# Patient Record
Sex: MALE | URBAN - METROPOLITAN AREA
[De-identification: names, ages, dates, MRNs, and addresses within clinical notes are randomized per-mention and may not be internally consistent; named-entity substitution may affect disease eponyms.]

---

## 2022-01-24 ENCOUNTER — OUTPATIENT (OUTPATIENT)
Dept: OUTPATIENT SERVICES | Facility: HOSPITAL | Age: 67
LOS: 1 days | End: 2022-01-24
Payer: MEDICARE

## 2022-01-24 VITALS
TEMPERATURE: 98 F | DIASTOLIC BLOOD PRESSURE: 95 MMHG | OXYGEN SATURATION: 98 % | HEART RATE: 65 BPM | RESPIRATION RATE: 14 BRPM | WEIGHT: 250 LBS | HEIGHT: 68 IN | SYSTOLIC BLOOD PRESSURE: 181 MMHG

## 2022-01-24 VITALS
HEART RATE: 83 BPM | OXYGEN SATURATION: 98 % | SYSTOLIC BLOOD PRESSURE: 120 MMHG | DIASTOLIC BLOOD PRESSURE: 77 MMHG | RESPIRATION RATE: 17 BRPM

## 2022-01-24 DIAGNOSIS — Z87.81 PERSONAL HISTORY OF (HEALED) TRAUMATIC FRACTURE: Chronic | ICD-10-CM

## 2022-01-24 DIAGNOSIS — Z90.79 ACQUIRED ABSENCE OF OTHER GENITAL ORGAN(S): Chronic | ICD-10-CM

## 2022-01-24 DIAGNOSIS — R94.09 ABNORMAL RESULTS OF OTHER FUNCTION STUDIES OF CENTRAL NERVOUS SYSTEM: ICD-10-CM

## 2022-01-24 LAB
BUN SERPL-MCNC: 23 MG/DL — SIGNIFICANT CHANGE UP (ref 7–23)
CALCIUM SERPL-MCNC: 9.8 MG/DL — SIGNIFICANT CHANGE UP (ref 8.4–10.5)
CHLORIDE SERPL-SCNC: 101 MMOL/L — SIGNIFICANT CHANGE UP (ref 96–108)
CO2 SERPL-SCNC: 20 MMOL/L — LOW (ref 22–31)
CREAT SERPL-MCNC: 1.07 MG/DL — SIGNIFICANT CHANGE UP (ref 0.5–1.3)
GLUCOSE BLDC GLUCOMTR-MCNC: 142 MG/DL — HIGH (ref 70–99)
GLUCOSE BLDC GLUCOMTR-MCNC: 146 MG/DL — HIGH (ref 70–99)
GLUCOSE BLDC GLUCOMTR-MCNC: 161 MG/DL — HIGH (ref 70–99)
GLUCOSE SERPL-MCNC: 157 MG/DL — HIGH (ref 70–99)
HCT VFR BLD CALC: 40.6 % — SIGNIFICANT CHANGE UP (ref 39–50)
HGB BLD-MCNC: 13.3 G/DL — SIGNIFICANT CHANGE UP (ref 13–17)
MCHC RBC-ENTMCNC: 29.2 PG — SIGNIFICANT CHANGE UP (ref 27–34)
MCHC RBC-ENTMCNC: 32.8 GM/DL — SIGNIFICANT CHANGE UP (ref 32–36)
MCV RBC AUTO: 89.2 FL — SIGNIFICANT CHANGE UP (ref 80–100)
NRBC # BLD: 0 /100 WBCS — SIGNIFICANT CHANGE UP (ref 0–0)
PLATELET # BLD AUTO: 234 K/UL — SIGNIFICANT CHANGE UP (ref 150–400)
POTASSIUM SERPL-MCNC: 4.3 MMOL/L — SIGNIFICANT CHANGE UP (ref 3.5–5.3)
POTASSIUM SERPL-SCNC: 4.3 MMOL/L — SIGNIFICANT CHANGE UP (ref 3.5–5.3)
RBC # BLD: 4.55 M/UL — SIGNIFICANT CHANGE UP (ref 4.2–5.8)
RBC # FLD: 12.7 % — SIGNIFICANT CHANGE UP (ref 10.3–14.5)
SODIUM SERPL-SCNC: 136 MMOL/L — SIGNIFICANT CHANGE UP (ref 135–145)
WBC # BLD: 5.84 K/UL — SIGNIFICANT CHANGE UP (ref 3.8–10.5)
WBC # FLD AUTO: 5.84 K/UL — SIGNIFICANT CHANGE UP (ref 3.8–10.5)

## 2022-01-24 PROCEDURE — 93454 CORONARY ARTERY ANGIO S&I: CPT | Mod: 26,59

## 2022-01-24 PROCEDURE — C1874: CPT

## 2022-01-24 PROCEDURE — 92928 PRQ TCAT PLMT NTRAC ST 1 LES: CPT | Mod: RC

## 2022-01-24 PROCEDURE — 93010 ELECTROCARDIOGRAM REPORT: CPT

## 2022-01-24 PROCEDURE — 93010 ELECTROCARDIOGRAM REPORT: CPT | Mod: 77

## 2022-01-24 PROCEDURE — 82962 GLUCOSE BLOOD TEST: CPT

## 2022-01-24 PROCEDURE — C1894: CPT

## 2022-01-24 PROCEDURE — C9600: CPT | Mod: RC

## 2022-01-24 PROCEDURE — C1769: CPT

## 2022-01-24 PROCEDURE — 92974 CATH PLACE CARDIO BRACHYTX: CPT

## 2022-01-24 PROCEDURE — C1761: CPT

## 2022-01-24 PROCEDURE — 99152 MOD SED SAME PHYS/QHP 5/>YRS: CPT

## 2022-01-24 PROCEDURE — 80048 BASIC METABOLIC PNL TOTAL CA: CPT

## 2022-01-24 PROCEDURE — C1725: CPT

## 2022-01-24 PROCEDURE — 93454 CORONARY ARTERY ANGIO S&I: CPT | Mod: 59

## 2022-01-24 PROCEDURE — 99153 MOD SED SAME PHYS/QHP EA: CPT

## 2022-01-24 PROCEDURE — 93005 ELECTROCARDIOGRAM TRACING: CPT

## 2022-01-24 PROCEDURE — C1887: CPT

## 2022-01-24 PROCEDURE — 85027 COMPLETE CBC AUTOMATED: CPT

## 2022-01-24 RX ORDER — SODIUM CHLORIDE 9 MG/ML
1000 INJECTION, SOLUTION INTRAVENOUS
Refills: 0 | Status: DISCONTINUED | OUTPATIENT
Start: 2022-01-24 | End: 2022-02-07

## 2022-01-24 RX ORDER — LOSARTAN POTASSIUM 100 MG/1
100 TABLET, FILM COATED ORAL DAILY
Refills: 0 | Status: DISCONTINUED | OUTPATIENT
Start: 2022-01-24 | End: 2022-02-07

## 2022-01-24 RX ORDER — GLUCAGON INJECTION, SOLUTION 0.5 MG/.1ML
1 INJECTION, SOLUTION SUBCUTANEOUS ONCE
Refills: 0 | Status: DISCONTINUED | OUTPATIENT
Start: 2022-01-24 | End: 2022-02-07

## 2022-01-24 RX ORDER — ASPIRIN/CALCIUM CARB/MAGNESIUM 324 MG
81 TABLET ORAL DAILY
Refills: 0 | Status: DISCONTINUED | OUTPATIENT
Start: 2022-01-24 | End: 2022-02-07

## 2022-01-24 RX ORDER — TICAGRELOR 90 MG/1
1 TABLET ORAL
Qty: 60 | Refills: 0
Start: 2022-01-24 | End: 2022-02-22

## 2022-01-24 RX ORDER — TICAGRELOR 90 MG/1
90 TABLET ORAL
Refills: 0 | Status: DISCONTINUED | OUTPATIENT
Start: 2022-01-24 | End: 2022-02-07

## 2022-01-24 RX ORDER — DEXTROSE 50 % IN WATER 50 %
25 SYRINGE (ML) INTRAVENOUS ONCE
Refills: 0 | Status: DISCONTINUED | OUTPATIENT
Start: 2022-01-24 | End: 2022-02-07

## 2022-01-24 RX ORDER — INSULIN LISPRO 100/ML
VIAL (ML) SUBCUTANEOUS
Refills: 0 | Status: DISCONTINUED | OUTPATIENT
Start: 2022-01-24 | End: 2022-02-07

## 2022-01-24 RX ORDER — PANTOPRAZOLE SODIUM 20 MG/1
40 TABLET, DELAYED RELEASE ORAL
Refills: 0 | Status: DISCONTINUED | OUTPATIENT
Start: 2022-01-24 | End: 2022-02-07

## 2022-01-24 RX ORDER — ALLOPURINOL 300 MG
100 TABLET ORAL
Refills: 0 | Status: DISCONTINUED | OUTPATIENT
Start: 2022-01-24 | End: 2022-02-07

## 2022-01-24 RX ORDER — INSULIN LISPRO 100/ML
VIAL (ML) SUBCUTANEOUS AT BEDTIME
Refills: 0 | Status: DISCONTINUED | OUTPATIENT
Start: 2022-01-24 | End: 2022-02-07

## 2022-01-24 RX ORDER — DEXTROSE 50 % IN WATER 50 %
15 SYRINGE (ML) INTRAVENOUS ONCE
Refills: 0 | Status: DISCONTINUED | OUTPATIENT
Start: 2022-01-24 | End: 2022-02-07

## 2022-01-24 RX ORDER — DEXTROSE 50 % IN WATER 50 %
12.5 SYRINGE (ML) INTRAVENOUS ONCE
Refills: 0 | Status: DISCONTINUED | OUTPATIENT
Start: 2022-01-24 | End: 2022-02-07

## 2022-01-24 RX ORDER — ATORVASTATIN CALCIUM 80 MG/1
40 TABLET, FILM COATED ORAL AT BEDTIME
Refills: 0 | Status: DISCONTINUED | OUTPATIENT
Start: 2022-01-24 | End: 2022-02-07

## 2022-01-24 RX ORDER — AMLODIPINE BESYLATE 2.5 MG/1
10 TABLET ORAL DAILY
Refills: 0 | Status: DISCONTINUED | OUTPATIENT
Start: 2022-01-24 | End: 2022-02-07

## 2022-01-24 RX ADMIN — TICAGRELOR 90 MILLIGRAM(S): 90 TABLET ORAL at 17:54

## 2022-01-24 NOTE — ASU PATIENT PROFILE, ADULT - FALL HARM RISK - UNIVERSAL INTERVENTIONS
Bed in lowest position, wheels locked, appropriate side rails in place/Call bell, personal items and telephone in reach/Instruct patient to call for assistance before getting out of bed or chair/Non-slip footwear when patient is out of bed/[place] to call system/Physically safe environment - no spills, clutter or unnecessary equipment/Purposeful Proactive Rounding/Room/bathroom lighting operational, light cord in reach

## 2022-01-24 NOTE — ASU DISCHARGE PLAN (ADULT/PEDIATRIC) - CARE PROVIDER_API CALL
Sunil Resendez (DO)  Cardiovascular Disease; Internal Medicine  [address] [place] [address], [facility] [address]  [place] [address]  Phone: [phone]  Fax: [phone]  Follow Up Time: 2 weeks

## 2022-01-24 NOTE — ASU DISCHARGE PLAN (ADULT/PEDIATRIC) - NS MD DC FALL RISK RISK
For information on Fall & Injury Prevention, visit: [email or web address] OR  [email or web address] OR  https://www.cdc.gov/steadi/patient.html

## 2022-01-24 NOTE — H&P CARDIOLOGY - HISTORY OF PRESENT ILLNESS
66 yr old  male with PMHx of HTN, HLD, DMT2 (Hgba1c, without complications, managed by ), Gout, GERD, Prostate Cancer presents for routine cardiac exam. CT Heart done on 1/10/22 showed total Ca score 2900, multivessel atherosclerotic CAD. Pt was referred for Cardiac Cath by Dr Resendez.  66 yr old  male with PMHx of HTN, HLD, DMT2 (Hgba1c 6.8, without complications, managed by  ), Gout, GERD, Prostate Cancer presents for routine cardiac exam. CT Heart done on 1/10/22 showed total Ca score 2900, multivessel atherosclerotic CAD. TTE done on 1/22 showed mild left ventricular enlargement and LVH, normal LV function, no wall motion abnormalities.  Pt was referred for Cardiac Cath by Dr Resendez.  66 yr old  male with PMHx of HTN, HLD, DMT2 (Hgba1c 6.8, without complications, managed by PCP Claire Sanchez), Gout, GERD, Prostate Cancer s/p prostatectomy 2011 presents for routine cardiac exam. CT Heart done on 1/10/22 showed total Ca score 2900, multivessel atherosclerotic CAD. TTE done on 1/22 showed mild left ventricular enlargement and LVH, normal LV function, no wall motion abnormalities. As per patient, stress test was completed but no results at this time. Pt was referred for Cardiac Cath by Dr Resendez.

## 2022-01-24 NOTE — H&P CARDIOLOGY - NSICDXPASTMEDICALHX_GEN_ALL_CORE_FT
PAST MEDICAL HISTORY:  GERD (gastroesophageal reflux disease)     Gout     HLD (hyperlipidemia)     HTN (hypertension)     Prostate cancer     Type 2 diabetes mellitus

## 2022-01-24 NOTE — ASU DISCHARGE PLAN (ADULT/PEDIATRIC) - FOLLOW UP APPOINTMENTS
[facility] at [phone] [facility] at [phone]

## 2022-02-24 RX ORDER — TICAGRELOR 90 MG/1
1 TABLET ORAL
Qty: 180 | Refills: 3
Start: 2022-02-24 | End: 2023-02-18

## 2022-09-21 ENCOUNTER — INPATIENT (INPATIENT)
Facility: HOSPITAL | Age: 67
LOS: 2 days | Discharge: HOME | DRG: 378 | End: 2022-09-24
Attending: INTERNAL MEDICINE | Admitting: INTERNAL MEDICINE
Payer: MEDICARE

## 2022-09-21 VITALS
SYSTOLIC BLOOD PRESSURE: 129 MMHG | DIASTOLIC BLOOD PRESSURE: 79 MMHG | RESPIRATION RATE: 20 BRPM | WEIGHT: 250 LBS | OXYGEN SATURATION: 98 % | HEART RATE: 74 BPM | HEIGHT: 68 IN | TEMPERATURE: 98 F

## 2022-09-21 DIAGNOSIS — E11.9 TYPE 2 DIABETES MELLITUS WITHOUT COMPLICATIONS: ICD-10-CM

## 2022-09-21 DIAGNOSIS — I25.10 ATHEROSCLEROTIC HEART DISEASE OF NATIVE CORONARY ARTERY WITHOUT ANGINA PECTORIS: ICD-10-CM

## 2022-09-21 DIAGNOSIS — K92.1 MELENA: ICD-10-CM

## 2022-09-21 DIAGNOSIS — Z90.79 ACQUIRED ABSENCE OF OTHER GENITAL ORGAN(S): Chronic | ICD-10-CM

## 2022-09-21 DIAGNOSIS — R09.89 OTHER SPECIFIED SYMPTOMS AND SIGNS INVOLVING THE CIRCULATORY AND RESPIRATORY SYSTEMS: ICD-10-CM

## 2022-09-21 DIAGNOSIS — Z87.81 PERSONAL HISTORY OF (HEALED) TRAUMATIC FRACTURE: Chronic | ICD-10-CM

## 2022-09-21 DIAGNOSIS — K92.2 GASTROINTESTINAL HEMORRHAGE, UNSPECIFIED: ICD-10-CM

## 2022-09-21 DIAGNOSIS — Z29.9 ENCOUNTER FOR PROPHYLACTIC MEASURES, UNSPECIFIED: ICD-10-CM

## 2022-09-21 DIAGNOSIS — I10 ESSENTIAL (PRIMARY) HYPERTENSION: ICD-10-CM

## 2022-09-21 LAB
ALBUMIN SERPL ELPH-MCNC: 4.9 G/DL — SIGNIFICANT CHANGE UP (ref 3.3–5)
ALP SERPL-CCNC: 51 U/L — SIGNIFICANT CHANGE UP (ref 40–120)
ALT FLD-CCNC: 23 U/L — SIGNIFICANT CHANGE UP (ref 10–45)
ANION GAP SERPL CALC-SCNC: 15 MMOL/L — SIGNIFICANT CHANGE UP (ref 5–17)
AST SERPL-CCNC: 25 U/L — SIGNIFICANT CHANGE UP (ref 10–40)
BASOPHILS # BLD AUTO: 0.05 K/UL — SIGNIFICANT CHANGE UP (ref 0–0.2)
BASOPHILS NFR BLD AUTO: 0.4 % — SIGNIFICANT CHANGE UP (ref 0–2)
BILIRUB SERPL-MCNC: 0.4 MG/DL — SIGNIFICANT CHANGE UP (ref 0.2–1.2)
BLD GP AB SCN SERPL QL: NEGATIVE — SIGNIFICANT CHANGE UP
BUN SERPL-MCNC: 36 MG/DL — HIGH (ref 7–23)
CALCIUM SERPL-MCNC: 10 MG/DL — SIGNIFICANT CHANGE UP (ref 8.4–10.5)
CHLORIDE SERPL-SCNC: 99 MMOL/L — SIGNIFICANT CHANGE UP (ref 96–108)
CO2 SERPL-SCNC: 22 MMOL/L — SIGNIFICANT CHANGE UP (ref 22–31)
CREAT SERPL-MCNC: 1.09 MG/DL — SIGNIFICANT CHANGE UP (ref 0.5–1.3)
EGFR: 74 ML/MIN/1.73M2 — SIGNIFICANT CHANGE UP
EOSINOPHIL # BLD AUTO: 0.08 K/UL — SIGNIFICANT CHANGE UP (ref 0–0.5)
EOSINOPHIL NFR BLD AUTO: 0.7 % — SIGNIFICANT CHANGE UP (ref 0–6)
GLUCOSE BLDC GLUCOMTR-MCNC: 141 MG/DL — HIGH (ref 70–99)
GLUCOSE SERPL-MCNC: 142 MG/DL — HIGH (ref 70–99)
HCT VFR BLD CALC: 33.7 % — LOW (ref 39–50)
HGB BLD-MCNC: 10.6 G/DL — LOW (ref 13–17)
IMM GRANULOCYTES NFR BLD AUTO: 0.6 % — SIGNIFICANT CHANGE UP (ref 0–0.9)
LYMPHOCYTES # BLD AUTO: 19.4 % — SIGNIFICANT CHANGE UP (ref 13–44)
LYMPHOCYTES # BLD AUTO: 2.26 K/UL — SIGNIFICANT CHANGE UP (ref 1–3.3)
MCHC RBC-ENTMCNC: 28.8 PG — SIGNIFICANT CHANGE UP (ref 27–34)
MCHC RBC-ENTMCNC: 31.5 GM/DL — LOW (ref 32–36)
MCV RBC AUTO: 91.6 FL — SIGNIFICANT CHANGE UP (ref 80–100)
MONOCYTES # BLD AUTO: 0.8 K/UL — SIGNIFICANT CHANGE UP (ref 0–0.9)
MONOCYTES NFR BLD AUTO: 6.9 % — SIGNIFICANT CHANGE UP (ref 2–14)
NEUTROPHILS # BLD AUTO: 8.36 K/UL — HIGH (ref 1.8–7.4)
NEUTROPHILS NFR BLD AUTO: 72 % — SIGNIFICANT CHANGE UP (ref 43–77)
NRBC # BLD: 0 /100 WBCS — SIGNIFICANT CHANGE UP (ref 0–0)
OB PNL STL: POSITIVE
PLATELET # BLD AUTO: 215 K/UL — SIGNIFICANT CHANGE UP (ref 150–400)
POTASSIUM SERPL-MCNC: 4.6 MMOL/L — SIGNIFICANT CHANGE UP (ref 3.5–5.3)
POTASSIUM SERPL-SCNC: 4.6 MMOL/L — SIGNIFICANT CHANGE UP (ref 3.5–5.3)
PROT SERPL-MCNC: 7.8 G/DL — SIGNIFICANT CHANGE UP (ref 6–8.3)
RBC # BLD: 3.68 M/UL — LOW (ref 4.2–5.8)
RBC # FLD: 13.1 % — SIGNIFICANT CHANGE UP (ref 10.3–14.5)
RH IG SCN BLD-IMP: POSITIVE — SIGNIFICANT CHANGE UP
SARS-COV-2 RNA SPEC QL NAA+PROBE: SIGNIFICANT CHANGE UP
SODIUM SERPL-SCNC: 136 MMOL/L — SIGNIFICANT CHANGE UP (ref 135–145)
WBC # BLD: 11.62 K/UL — HIGH (ref 3.8–10.5)
WBC # FLD AUTO: 11.62 K/UL — HIGH (ref 3.8–10.5)

## 2022-09-21 PROCEDURE — 99285 EMERGENCY DEPT VISIT HI MDM: CPT | Mod: FS,25

## 2022-09-21 PROCEDURE — 99223 1ST HOSP IP/OBS HIGH 75: CPT

## 2022-09-21 PROCEDURE — 93010 ELECTROCARDIOGRAM REPORT: CPT

## 2022-09-21 RX ORDER — INSULIN LISPRO 100/ML
VIAL (ML) SUBCUTANEOUS
Refills: 0 | Status: DISCONTINUED | OUTPATIENT
Start: 2022-09-21 | End: 2022-09-24

## 2022-09-21 RX ORDER — DEXTROSE 50 % IN WATER 50 %
25 SYRINGE (ML) INTRAVENOUS ONCE
Refills: 0 | Status: DISCONTINUED | OUTPATIENT
Start: 2022-09-21 | End: 2022-09-24

## 2022-09-21 RX ORDER — GLUCAGON INJECTION, SOLUTION 0.5 MG/.1ML
1 INJECTION, SOLUTION SUBCUTANEOUS ONCE
Refills: 0 | Status: DISCONTINUED | OUTPATIENT
Start: 2022-09-21 | End: 2022-09-24

## 2022-09-21 RX ORDER — INSULIN LISPRO 100/ML
VIAL (ML) SUBCUTANEOUS AT BEDTIME
Refills: 0 | Status: DISCONTINUED | OUTPATIENT
Start: 2022-09-21 | End: 2022-09-24

## 2022-09-21 RX ORDER — ACETAMINOPHEN 500 MG
650 TABLET ORAL EVERY 6 HOURS
Refills: 0 | Status: DISCONTINUED | OUTPATIENT
Start: 2022-09-21 | End: 2022-09-24

## 2022-09-21 RX ORDER — ATORVASTATIN CALCIUM 80 MG/1
20 TABLET, FILM COATED ORAL DAILY
Refills: 0 | Status: DISCONTINUED | OUTPATIENT
Start: 2022-09-21 | End: 2022-09-24

## 2022-09-21 RX ORDER — PANTOPRAZOLE SODIUM 20 MG/1
40 TABLET, DELAYED RELEASE ORAL
Refills: 0 | Status: DISCONTINUED | OUTPATIENT
Start: 2022-09-21 | End: 2022-09-22

## 2022-09-21 RX ORDER — LANOLIN ALCOHOL/MO/W.PET/CERES
3 CREAM (GRAM) TOPICAL AT BEDTIME
Refills: 0 | Status: DISCONTINUED | OUTPATIENT
Start: 2022-09-21 | End: 2022-09-24

## 2022-09-21 RX ORDER — DEXTROSE 50 % IN WATER 50 %
12.5 SYRINGE (ML) INTRAVENOUS ONCE
Refills: 0 | Status: DISCONTINUED | OUTPATIENT
Start: 2022-09-21 | End: 2022-09-24

## 2022-09-21 RX ORDER — SODIUM CHLORIDE 9 MG/ML
1000 INJECTION, SOLUTION INTRAVENOUS
Refills: 0 | Status: DISCONTINUED | OUTPATIENT
Start: 2022-09-21 | End: 2022-09-24

## 2022-09-21 RX ORDER — DEXTROSE 50 % IN WATER 50 %
15 SYRINGE (ML) INTRAVENOUS ONCE
Refills: 0 | Status: DISCONTINUED | OUTPATIENT
Start: 2022-09-21 | End: 2022-09-24

## 2022-09-21 RX ORDER — ALLOPURINOL 300 MG
100 TABLET ORAL DAILY
Refills: 0 | Status: DISCONTINUED | OUTPATIENT
Start: 2022-09-21 | End: 2022-09-24

## 2022-09-21 RX ORDER — ONDANSETRON 8 MG/1
4 TABLET, FILM COATED ORAL EVERY 8 HOURS
Refills: 0 | Status: DISCONTINUED | OUTPATIENT
Start: 2022-09-21 | End: 2022-09-24

## 2022-09-21 NOTE — H&P ADULT - ASSESSMENT
67M w/ hx of CAD s/p mRCA lithotripsy and stent 01/2022, Prostate Ca, DM2, gout, HTN, HLD, GERD p/w melena

## 2022-09-21 NOTE — H&P ADULT - PROBLEM SELECTOR PLAN 2
S/p mRCA lithotripsy and stent 01/2022. Has been on DAPT for >6 months. Given current GIB will hold DAPT for now  -Holding asa and plavix overnight  -Cont. atorvastatin  -Would consult Cardiology in AM regarding continuation of DAPT

## 2022-09-21 NOTE — H&P ADULT - NSHPLABSRESULTS_GEN_ALL_CORE
I have reviewed the labs, prior imaging and ekg. EKG with sinus bradycardia HR 59 QTc 409 non-specific ST segment findings,

## 2022-09-21 NOTE — ED PROVIDER NOTE - NS ED ATTENDING STATEMENT MOD
This was a shared visit with the CHALO. I reviewed and verified the documentation and independently performed the documented:

## 2022-09-21 NOTE — ED PROVIDER NOTE - ATTENDING APP SHARED VISIT CONTRIBUTION OF CARE
MD Cassidy:  patient seen and evaluated with the resident.  I was present for key portions of the History & Physical, and I agree with the Impression & Plan.  MD Cassidy:  66 yo M, on ASA/Plavix, c/o black stools and 5d intermittent RUIZ.  Duration:  24 hrs of black stools  Intensity:  5 episodes of loose stools.  Quality:  black; melena.    Associated Sx:  light-headedness, fatigue, and RUIZ x 24 hrs.    VS: wnl.  Physical Exam: adult M, NAD, NCAT, PERRL, EOMI, neck supple, RRR, CTA B, Abd: s/nd/nt, Ext: no edema, Neuro: AAOx3, ambulates w/o diff, strength 5/5 & symmetric throughout.  ECG: no STEMI  Impression:  symptomatic UGIB, (light-headedness, RUIZ).  No hypotension or abdominal pain significant enough to warrant CT abd or bleeding study at this time.    Plan:  type and screen, cbc, cmp, inr, admit, reassess.    Not CDU candidate given active bleeding.  GI Consult.  Primary MD:  Daniel Rangel  Primary GI: n/a MD Cassidy:  patient seen and evaluated with the resident.  I was present for key portions of the History & Physical, and I agree with the Impression & Plan.  MD Cassidy:  68 yo M, on ASA/Plavix, c/o black stools and 5d intermittent RUIZ.  Duration:  24 hrs of black stools  Intensity:  5 episodes of loose stools.  Quality:  black; melena.    Associated Sx:  light-headedness, fatigue, and RUIZ x 24 hrs.    Most recent H&H is from 1/2022 = 13.3/40.6  VS: wnl.  Physical Exam: adult M, NAD, NCAT, PERRL, EOMI, neck supple, RRR, CTA B, Abd: s/nd/nt, Ext: no edema, Neuro: AAOx3, ambulates w/o diff, strength 5/5 & symmetric throughout.  ECG: no STEMI  Impression:  symptomatic UGIB, (light-headedness, RUIZ).  No hypotension or abdominal pain significant enough to warrant CT abd or bleeding study at this time.    Plan:  type and screen, cbc, cmp, inr, admit, reassess.    Not CDU candidate given active bleeding.  GI Consult.  Primary MD:  Daniel Rangel  Primary GI: n/a

## 2022-09-21 NOTE — H&P ADULT - OPHTHALMOLOGIC
Patient : Felice Wiggins Age: 55 year old Sex: male   MRN: [id number] Encounter Date: 6/24/2020      History   CHIEF COMPLAINT    Chief Complaint   Patient presents with   • Fall   • Back Pain       HPI    Felice Wiggins is a 55 year old male who presents to the ED with fall and back pain. Patient states that since October he has had left hip pain.  Patient states that sometimes his left leg will give out.  He walks with a cane to use as needed.  He states he was getting out of a chair when his left leg gave out causing him to fall.  He has left hip and back pain.  He has taken Norco at home.  He denies any new numbness or tingling.  He has been working with his primary care, Dr. VEGA who is referred him to pain management but he has not yet seen them.  He denies hitting his head or any other injuries.  Patient is morbidly obese with a history of diabetes and hypertension.    ALLERGIES:   Allergen Reactions   • Bee ANAPHYLAXIS       Prior to Admission Medications    ALPRAZOLAM (XANAX) 1 MG TABLET    Take 1 mg by mouth nightly as needed for Sleep.    ASPIRIN 81 MG TABLET    Take 81 mg by mouth daily.    BUPROPION (WELLBUTRIN SR) 100 MG 12 HR TABLET    Take 100 mg by mouth 2 times daily.    FUROSEMIDE (LASIX) 20 MG TABLET    Take 20 mg by mouth 2 times daily.    HYDROCHLOROTHIAZIDE PO        LISINOPRIL (ZESTRIL) 20 MG TABLET    Take 20 mg by mouth daily.    LOVASTATIN PO        METFORMIN (GLUCOPHAGE) 850 MG TABLET    Take 850 mg by mouth 2 times daily (with meals).    METOPROLOL TARTRATE PO           New Prescriptions    METHYLPREDNISOLONE (MEDROL, MANJEET,) 4 MG TABLET    follow package directions       Past Medical History:   Diagnosis Date   • Diabetes mellitus (CMS/HCC)    • Essential (primary) hypertension        Past Surgical History:   Procedure Laterality Date   • Foot surgery     • Hernia repair     • Toe amputation         No family history on file.    Social History     Tobacco Use   • Smoking status: Current  Every Day Smoker     Packs/day: 1.00     Types: Cigarettes   • Smokeless tobacco: Never Used   Substance Use Topics   • Alcohol use: Not Currently   • Drug use: Yes     Types: Marijuana       ROS: Other than systems discussed in HPI, 10 point review of systems is negative    Physical Exam     Vitals:    06/24/20 1534   BP: (!) 130/95   Pulse: 90   Resp: 20   Temp: 97.8 °F (36.6 °C)   TempSrc: Oral   SpO2: 99%       Physical Exam   Constitutional: He is oriented to person, place, and time. He appears well-developed and well-nourished.   Eating crackers and peanut butter.    HENT:   Head: Normocephalic and atraumatic.   Right Ear: External ear normal.   Left Ear: External ear normal.   Nose: Nose normal.   Eyes: Conjunctivae are normal.   Neck: Neck supple.   Cardiovascular: Normal rate and regular rhythm.   Pulmonary/Chest: Effort normal and breath sounds normal.   Abdominal: Soft. There is no abdominal tenderness. There is no rebound.   obese   Musculoskeletal:      Left hip: He exhibits tenderness (laterally). He exhibits normal range of motion and no deformity.      Cervical back: Normal.      Thoracic back: Normal. He exhibits no tenderness and no bony tenderness.      Lumbar back: He exhibits tenderness (left sided), pain and spasm. He exhibits normal range of motion and no bony tenderness.   Neurological: He is alert and oriented to person, place, and time.   Skin: Skin is warm and dry. No erythema.   Nursing note and vitals reviewed.          ED Course   56 yo male presents with left hip and back pain. He states he was trying to stand up out of a chair when his left leg gave out causing him to fall to the ground. No head injury.     On PE he has left lumbar tenderness. He is able to sit up and lay flat, feels better sitting up. He has tenderness to the left lateral hip. He has full passive ROM. Pain with Active ROM. NV intact. Bilateral LE edema, chronic per patient. Normally wears compression stockings.      Xray of the hip and lumbar spine obtained. He is given valium     X-ray shows degenerative changes in the back of mild scoliosis but no acute fracture.  X-rays of the hip show mild to moderate osteoarthritis.  No fracture noted.  Patient has Norco and Flexeril at home.  I will add a Medrol Dosepak.  He is referred to pain management and encouraged to follow-up closely.  He is able to walk with a cane.  He can be discharged home.  He will return here as needed    RADIOLOGY    XR LUMBAR SPINE 2 OR 3 VIEWS   Final Result   1.   Degenerative changes and mild scoliosis with no acute fracture or subluxation identified..       Electronically Signed by: DAVID MARINELLI M.D.    Signed on: 6/24/2020 4:58 PM          XR HIP LEFT 2 VIEWS W PELVIS 1 VIEW   Final Result   1.   Moderate osteoarthritis left hip.   2.    No acute hip or pelvic fractures identified.      Electronically Signed by: DAVID MARINELLI M.D.    Signed on: 6/24/2020 4:59 PM              LABS    Labs Reviewed - No data to display      Impression:  The primary encounter diagnosis was Left low back pain, unspecified chronicity, unspecified whether sciatica present. A diagnosis of Left hip pain was also pertinent to this visit.    Follow Up:  Liam Reddy MD  [address]  [address]  [facility] [id number] [phone]      For follow up with primary care in 1-3 days.    Adonis Prajapati MD  [address]  [address]  [facility] [address]  [phone]    Schedule an appointment as soon as possible for a visit   for pain management       New Prescriptions    METHYLPREDNISOLONE (MEDROL, MANJEET,) 4 MG TABLET    follow package directions           Discharge Instructions were provided    Patient was seen in joint care with Dr. ingram        Signed:  Kendall Titus PA-C  6/24/2020         Kendall Titus PA-C  06/24/20 9720     details…

## 2022-09-21 NOTE — ED PROVIDER NOTE - RAPID ASSESSMENT
67y M w/ PMHx of GERD, Gout, Prostate CA, DMT2, HLD, HTN presents to the ED c/o 4-6 episodes of dark stool over 3 days, dizziness, abd discomfort, loose stool. Pt is on ASA and Plavix. Denies SOB. Denies Hx of abd bleeding. Pt is well appearing in triage.    Ally FIORE (Scribe) have documented this rapid assessment note under the dictation of Andrade Anne) which has been reviewed and affirmed to be accurate. Patient was seen as a QPA patient. 67y M w/ PMHx of GERD, Gout, Prostate CA, DMT2, HLD, HTN presents to the ED c/o 4-6 episodes of dark stool over 3 days, dizziness, abd discomfort, loose stool. Pt is on ASA and Plavix. Denies SOB. Denies Hx of abd bleeding. Pt is well appearing in triage.    Ally FIORE (Scribe) have documented this rapid assessment note under the dictation of Andrade Anne (PA) which has been reviewed and affirmed to be accurate. Patient was seen as a QPA patient.    Andrade Anne (PA) note: This scribe's documentation has been prepared under my direction and personally reviewed by me. The patient will be seen and further worked up in the main emergency department and their care will be completed by the main emergency department team along with a thorough physical exam. Receiving team will follow up on labs, analgesia, any clinical imaging, reassess and disposition as clinically indicated, all decisions regarding the progression of care will be made at their discretion.

## 2022-09-21 NOTE — ED PROVIDER NOTE - CLINICAL SUMMARY MEDICAL DECISION MAKING FREE TEXT BOX
Impression:  symptomatic UGIB, (light-headedness, RUIZ).  No hypotension or abdominal pain significant enough to warrant CT abd or bleeding study at this time.    Plan:  type and screen, cbc, cmp, inr, admit, reassess.    Not CDU candidate given active bleeding.  GI Consult.  Primary MD:  Daniel Rangel  Primary GI: n/a

## 2022-09-21 NOTE — ED PROVIDER NOTE - OBJECTIVE STATEMENT
67y male pmhx GERD, DMII, HTN, HLD CAD w/stents, gout p/w several episodes of dark stool over 3 days with associated abdominal discomfort, dizziness and weakness. abd discomfort is mild, generalized, described as rectal urgency felt before having loose bowel movements. denies hx of GI bleed. denies hx of UC/crohns. on ASA and plavix daily. Denies CP, SOB, NVD, hematochezia, hematemesis, fever, chills

## 2022-09-21 NOTE — H&P ADULT - NSHPADDITIONALINFOADULT_GEN_ALL_CORE
I was asked to see this patient by the hospitalist in charge overnight. [facility] to assume care for patient in AM and thereafter

## 2022-09-21 NOTE — H&P ADULT - PROBLEM SELECTOR PLAN 1
Hgb 10.6 lower than prior admission. Suspicious for upper GIB.   -Trend cbc, repeat tonight STAT, if otherwise stable then next in AM  -Will start PPI IV BID  -Clear liquid diet  -GI consult in AM as per [facility]

## 2022-09-21 NOTE — H&P ADULT - PROBLEM SELECTOR PLAN 4
-Trend BP  -Hold HCTZ, losartan and amlodipine for now in setting of GIB and recent hypotensive episode

## 2022-09-21 NOTE — ED PROVIDER NOTE - PROGRESS NOTE DETAILS
jennie on rectal exam, FOBT sent. chaperone RN Megan Willard PA-C GI emailed for consult - Brendon Willard PA-C

## 2022-09-21 NOTE — H&P ADULT - HISTORY OF PRESENT ILLNESS
67M w/ hx of CAD s/p mRCA lithotripsy and stent 01/2022, Prostate Ca, DM2, gout, HTN, HLD, GERD p/w melena. Pt endorses having 2-3 days of dark sticky stool with 2-3 episodes per a day. Last episode today was around 10am. Pt then went to son's apartment and became very lightheaded/ dizzy after walking up stairs. Took his BP and found himself 86/66 which then improved to 100s/70s. Sons took him to hospital for further evaluation. Denies any chest pain, SOB, epigastric pain or palpitations. Does endorse some dyspepsia. Denies taking any other new medications. Endorses normal colonoscopy in past and no hx of EGD.

## 2022-09-21 NOTE — H&P ADULT - NSHPPHYSICALEXAM_GEN_ALL_CORE
Vital Signs Last 24 Hrs  T(C): 36.5 (09-21-22 @ 18:38), Max: 36.6 (09-21-22 @ 14:45)  T(F): 97.7 (09-21-22 @ 18:38), Max: 97.9 (09-21-22 @ 14:45)  HR: 79 (09-21-22 @ 18:38) (74 - 79)  BP: 150/74 (09-21-22 @ 18:38) (129/79 - 150/74)  BP(mean): --  RR: 18 (09-21-22 @ 18:38) (18 - 20)  SpO2: 98% (09-21-22 @ 18:38) (98% - 98%)

## 2022-09-22 ENCOUNTER — RESULT REVIEW (OUTPATIENT)
Age: 67
End: 2022-09-22

## 2022-09-22 LAB
A1C WITH ESTIMATED AVERAGE GLUCOSE RESULT: 7.5 % — HIGH (ref 4–5.6)
ALBUMIN SERPL ELPH-MCNC: 4.3 G/DL — SIGNIFICANT CHANGE UP (ref 3.3–5)
ALP SERPL-CCNC: 47 U/L — SIGNIFICANT CHANGE UP (ref 40–120)
ALT FLD-CCNC: 18 U/L — SIGNIFICANT CHANGE UP (ref 10–45)
ANION GAP SERPL CALC-SCNC: 12 MMOL/L — SIGNIFICANT CHANGE UP (ref 5–17)
APTT BLD: 26.3 SEC — LOW (ref 27.5–35.5)
AST SERPL-CCNC: 16 U/L — SIGNIFICANT CHANGE UP (ref 10–40)
BASOPHILS # BLD AUTO: 0.05 K/UL — SIGNIFICANT CHANGE UP (ref 0–0.2)
BASOPHILS NFR BLD AUTO: 0.7 % — SIGNIFICANT CHANGE UP (ref 0–2)
BILIRUB SERPL-MCNC: 0.5 MG/DL — SIGNIFICANT CHANGE UP (ref 0.2–1.2)
BUN SERPL-MCNC: 26 MG/DL — HIGH (ref 7–23)
CALCIUM SERPL-MCNC: 9.5 MG/DL — SIGNIFICANT CHANGE UP (ref 8.4–10.5)
CHLORIDE SERPL-SCNC: 102 MMOL/L — SIGNIFICANT CHANGE UP (ref 96–108)
CO2 SERPL-SCNC: 24 MMOL/L — SIGNIFICANT CHANGE UP (ref 22–31)
CREAT SERPL-MCNC: 1.07 MG/DL — SIGNIFICANT CHANGE UP (ref 0.5–1.3)
EGFR: 76 ML/MIN/1.73M2 — SIGNIFICANT CHANGE UP
EOSINOPHIL # BLD AUTO: 0.14 K/UL — SIGNIFICANT CHANGE UP (ref 0–0.5)
EOSINOPHIL NFR BLD AUTO: 1.9 % — SIGNIFICANT CHANGE UP (ref 0–6)
ESTIMATED AVERAGE GLUCOSE: 169 MG/DL — HIGH (ref 68–114)
GLUCOSE BLDC GLUCOMTR-MCNC: 122 MG/DL — HIGH (ref 70–99)
GLUCOSE BLDC GLUCOMTR-MCNC: 145 MG/DL — HIGH (ref 70–99)
GLUCOSE BLDC GLUCOMTR-MCNC: 155 MG/DL — HIGH (ref 70–99)
GLUCOSE SERPL-MCNC: 134 MG/DL — HIGH (ref 70–99)
HCT VFR BLD CALC: 29 % — LOW (ref 39–50)
HCT VFR BLD CALC: 29.8 % — LOW (ref 39–50)
HCV AB S/CO SERPL IA: 0.08 S/CO — SIGNIFICANT CHANGE UP (ref 0–0.99)
HCV AB SERPL-IMP: SIGNIFICANT CHANGE UP
HGB BLD-MCNC: 9.3 G/DL — LOW (ref 13–17)
HGB BLD-MCNC: 9.4 G/DL — LOW (ref 13–17)
IMM GRANULOCYTES NFR BLD AUTO: 0.5 % — SIGNIFICANT CHANGE UP (ref 0–0.9)
INR BLD: 1.03 RATIO — SIGNIFICANT CHANGE UP (ref 0.88–1.16)
LYMPHOCYTES # BLD AUTO: 1.6 K/UL — SIGNIFICANT CHANGE UP (ref 1–3.3)
LYMPHOCYTES # BLD AUTO: 21.7 % — SIGNIFICANT CHANGE UP (ref 13–44)
MAGNESIUM SERPL-MCNC: 1.9 MG/DL — SIGNIFICANT CHANGE UP (ref 1.6–2.6)
MCHC RBC-ENTMCNC: 28.7 PG — SIGNIFICANT CHANGE UP (ref 27–34)
MCHC RBC-ENTMCNC: 28.7 PG — SIGNIFICANT CHANGE UP (ref 27–34)
MCHC RBC-ENTMCNC: 31.5 GM/DL — LOW (ref 32–36)
MCHC RBC-ENTMCNC: 32.1 GM/DL — SIGNIFICANT CHANGE UP (ref 32–36)
MCV RBC AUTO: 89.5 FL — SIGNIFICANT CHANGE UP (ref 80–100)
MCV RBC AUTO: 91.1 FL — SIGNIFICANT CHANGE UP (ref 80–100)
MONOCYTES # BLD AUTO: 0.7 K/UL — SIGNIFICANT CHANGE UP (ref 0–0.9)
MONOCYTES NFR BLD AUTO: 9.5 % — SIGNIFICANT CHANGE UP (ref 2–14)
NEUTROPHILS # BLD AUTO: 4.86 K/UL — SIGNIFICANT CHANGE UP (ref 1.8–7.4)
NEUTROPHILS NFR BLD AUTO: 65.7 % — SIGNIFICANT CHANGE UP (ref 43–77)
NRBC # BLD: 0 /100 WBCS — SIGNIFICANT CHANGE UP (ref 0–0)
NRBC # BLD: 0 /100 WBCS — SIGNIFICANT CHANGE UP (ref 0–0)
PLATELET # BLD AUTO: 215 K/UL — SIGNIFICANT CHANGE UP (ref 150–400)
PLATELET # BLD AUTO: 224 K/UL — SIGNIFICANT CHANGE UP (ref 150–400)
POTASSIUM SERPL-MCNC: 4.2 MMOL/L — SIGNIFICANT CHANGE UP (ref 3.5–5.3)
POTASSIUM SERPL-SCNC: 4.2 MMOL/L — SIGNIFICANT CHANGE UP (ref 3.5–5.3)
PROT SERPL-MCNC: 6.6 G/DL — SIGNIFICANT CHANGE UP (ref 6–8.3)
PROTHROM AB SERPL-ACNC: 11.8 SEC — SIGNIFICANT CHANGE UP (ref 10.5–13.4)
RBC # BLD: 3.24 M/UL — LOW (ref 4.2–5.8)
RBC # BLD: 3.27 M/UL — LOW (ref 4.2–5.8)
RBC # FLD: 13.1 % — SIGNIFICANT CHANGE UP (ref 10.3–14.5)
RBC # FLD: 13.1 % — SIGNIFICANT CHANGE UP (ref 10.3–14.5)
SODIUM SERPL-SCNC: 138 MMOL/L — SIGNIFICANT CHANGE UP (ref 135–145)
WBC # BLD: 7.39 K/UL — SIGNIFICANT CHANGE UP (ref 3.8–10.5)
WBC # BLD: 8.67 K/UL — SIGNIFICANT CHANGE UP (ref 3.8–10.5)
WBC # FLD AUTO: 7.39 K/UL — SIGNIFICANT CHANGE UP (ref 3.8–10.5)
WBC # FLD AUTO: 8.67 K/UL — SIGNIFICANT CHANGE UP (ref 3.8–10.5)

## 2022-09-22 PROCEDURE — 88305 TISSUE EXAM BY PATHOLOGIST: CPT | Mod: 26

## 2022-09-22 PROCEDURE — 88342 IMHCHEM/IMCYTCHM 1ST ANTB: CPT | Mod: 26

## 2022-09-22 RX ORDER — PANTOPRAZOLE SODIUM 20 MG/1
8 TABLET, DELAYED RELEASE ORAL
Qty: 80 | Refills: 0 | Status: DISCONTINUED | OUTPATIENT
Start: 2022-09-22 | End: 2022-09-23

## 2022-09-22 RX ORDER — INFLUENZA VIRUS VACCINE 15; 15; 15; 15 UG/.5ML; UG/.5ML; UG/.5ML; UG/.5ML
0.7 SUSPENSION INTRAMUSCULAR ONCE
Refills: 0 | Status: DISCONTINUED | OUTPATIENT
Start: 2022-09-22 | End: 2022-09-24

## 2022-09-22 RX ORDER — SODIUM CHLORIDE 9 MG/ML
1000 INJECTION, SOLUTION INTRAVENOUS
Refills: 0 | Status: DISCONTINUED | OUTPATIENT
Start: 2022-09-22 | End: 2022-09-23

## 2022-09-22 RX ADMIN — ATORVASTATIN CALCIUM 20 MILLIGRAM(S): 80 TABLET, FILM COATED ORAL at 21:48

## 2022-09-22 RX ADMIN — SODIUM CHLORIDE 75 MILLILITER(S): 9 INJECTION, SOLUTION INTRAVENOUS at 21:49

## 2022-09-22 RX ADMIN — Medication 1: at 09:39

## 2022-09-22 RX ADMIN — PANTOPRAZOLE SODIUM 40 MILLIGRAM(S): 20 TABLET, DELAYED RELEASE ORAL at 05:04

## 2022-09-22 RX ADMIN — PANTOPRAZOLE SODIUM 10 MG/HR: 20 TABLET, DELAYED RELEASE ORAL at 21:49

## 2022-09-22 RX ADMIN — PANTOPRAZOLE SODIUM 10 MG/HR: 20 TABLET, DELAYED RELEASE ORAL at 10:55

## 2022-09-22 RX ADMIN — Medication 100 MILLIGRAM(S): at 12:00

## 2022-09-22 NOTE — CONSULT NOTE ADULT - SUBJECTIVE AND OBJECTIVE BOX
CARDIOLOGY CONSULT - Dr. Upton  Date of Service: 9/22/22      HPI:  67M w/ hx of CAD s/p mRCA lithotripsy and stent 01/2022, Prostate Ca, DM2, gout, HTN, HLD, GERD p/w melena. Pt endorses having 2-3 days of dark sticky stool with 2-3 episodes per a day. Last episode today was around 10am. Pt then went to son's apartment and became very lightheaded/ dizzy after walking up stairs. Took his BP and found himself 86/66 which then improved to 100s/70s. Sons took him to hospital for further evaluation. Denies any chest pain, SOB, epigastric pain or palpitations. Does endorse some dyspepsia. Denies taking any other new medications. Endorses normal colonoscopy in past and no hx of EGD.    (21 Sep 2022 21:35)      PAST MEDICAL & SURGICAL HISTORY:  HTN (hypertension)      HLD (hyperlipidemia)      Type 2 diabetes mellitus      Gout      GERD (gastroesophageal reflux disease)      Prostate cancer      H/O prostatectomy      History of wrist fracture  surgery              PREVIOUS DIAGNOSTIC TESTING:    [ ] Echocardiogram:  [ ]  Catheterization:  [ ] Stress Test:  	    MEDICATIONS:  MEDICATIONS  (STANDING):  allopurinol 100 milliGRAM(s) Oral daily  atorvastatin 20 milliGRAM(s) Oral daily  dextrose 5%. 1000 milliLiter(s) (50 mL/Hr) IV Continuous <Continuous>  dextrose 5%. 1000 milliLiter(s) (100 mL/Hr) IV Continuous <Continuous>  dextrose 50% Injectable 25 Gram(s) IV Push once  dextrose 50% Injectable 12.5 Gram(s) IV Push once  dextrose 50% Injectable 25 Gram(s) IV Push once  glucagon  Injectable 1 milliGRAM(s) IntraMuscular once  insulin lispro (ADMELOG) corrective regimen sliding scale   SubCutaneous three times a day before meals  insulin lispro (ADMELOG) corrective regimen sliding scale   SubCutaneous at bedtime  lactated ringers. 1000 milliLiter(s) (75 mL/Hr) IV Continuous <Continuous>  pantoprazole Infusion 8 mG/Hr (10 mL/Hr) IV Continuous <Continuous>      FAMILY HISTORY:  No pertinent family history in first degree relatives        SOCIAL HISTORY:    [ ] Non-smoker  [ ] Smoker  [ ] Alcohol    Allergies    No Known Allergies    Intolerances    	    REVIEW OF SYSTEMS:  CONSTITUTIONAL: No fever, weight loss, or fatigue  EYES: No eye pain, visual disturbances, or discharge  ENMT:  No difficulty hearing, tinnitus, vertigo; No sinus or throat pain  NECK: No pain or stiffness  RESPIRATORY: No cough, wheezing, chills or hemoptysis; No Shortness of Breath  CARDIOVASCULAR: No chest pain, palpitations, passing out, dizziness, or leg swelling  GASTROINTESTINAL: No abdominal or epigastric pain. No nausea, vomiting, or hematemesis; No diarrhea or constipation. No melena or hematochezia.  GENITOURINARY: No dysuria, frequency, hematuria, or incontinence  NEUROLOGICAL: No headaches, memory loss, loss of strength, numbness, or tremors  SKIN: No itching, burning, rashes, or lesions   	    [ ] All others negative	  [ ] Unable to obtain    PHYSICAL EXAM:  T(C): 36.6 (09-22-22 @ 16:40), Max: 36.6 (09-22-22 @ 04:45)  HR: 75 (09-22-22 @ 16:40) (61 - 79)  BP: 158/74 (09-22-22 @ 16:40) (119/72 - 158/74)  RR: 22 (09-22-22 @ 16:40) (17 - 22)  SpO2: 97% (09-22-22 @ 16:40) (97% - 98%)  Wt(kg): --  I&O's Summary      Appearance: Normal	  Psychiatry: A & O x 3, Mood & affect appropriate  HEENT:   Normal oral mucosa, PERRL, EOMI	  Lymphatic: No lymphadenopathy  Cardiovascular: Normal S1 S2,RRR, No JVD, No murmurs  Respiratory: Lungs clear to auscultation	  Gastrointestinal:  Soft, Non-tender, + BS	  Skin: No rashes, No ecchymoses, No cyanosis	  Neurologic: Non-focal  Extremities: Normal range of motion, No clubbing, cyanosis or edema  Vascular: Peripheral pulses palpable 2+ bilaterally    TELEMETRY: 	    ECG:  	  RADIOLOGY:  OTHER: 	  	  LABS:	 	    CARDIAC MARKERS:                                  9.4    7.39  )-----------( 215      ( 22 Sep 2022 07:21 )             29.8     09-22    138  |  102  |  26<H>  ----------------------------<  134<H>  4.2   |  24  |  1.07    Ca    9.5      22 Sep 2022 07:21  Mg     1.9     09-22    TPro  6.6  /  Alb  4.3  /  TBili  0.5  /  DBili  x   /  AST  16  /  ALT  18  /  AlkPhos  47  09-22    PT/INR - ( 22 Sep 2022 09:45 )   PT: 11.8 sec;   INR: 1.03 ratio         PTT - ( 22 Sep 2022 09:45 )  PTT:26.3 sec  proBNP:   Lipid Profile:   HgA1c:   TSH:     ASSESSMENT/PLAN: 	              70 minutes spent on total encounter; more than 50% of the visit was spent counseling and/or coordinating care by the attending physician.  
Chief Complaint:  Patient is a 67y old  Male who presents with a chief complaint of Dark stools (21 Sep 2022 21:35)      Date of service: 09-22-22 @ 10:47    HPI:    The patient is a 67 year old male with history of CAD s/p mRCA lithotripsy and stent 01/2022, Prostate Ca, DM2, gout, HTN, HLD, GERD presenting with melena x 2 days. States stools were loose and black. Last episode was yesterday ay 10am. Was dizzy and hypotensive at home so came to ED for further evaluation No history of GI bleed. History of colonoscopy in July 2022 with Dr. Samuel that was normal. Has never had an EGD. Denies nausea, vomiting, abdominal pain, unintentional weight loss, dysphagia. On aspirin and Plavix, last dose yesterday morning.     Allergies:  No Known Allergies      Home Medications:    Hospital Medications:  acetaminophen     Tablet .. 650 milliGRAM(s) Oral every 6 hours PRN  allopurinol 100 milliGRAM(s) Oral daily  atorvastatin 20 milliGRAM(s) Oral daily  dextrose 5%. 1000 milliLiter(s) IV Continuous <Continuous>  dextrose 5%. 1000 milliLiter(s) IV Continuous <Continuous>  dextrose 50% Injectable 25 Gram(s) IV Push once  dextrose 50% Injectable 12.5 Gram(s) IV Push once  dextrose 50% Injectable 25 Gram(s) IV Push once  dextrose Oral Gel 15 Gram(s) Oral once PRN  glucagon  Injectable 1 milliGRAM(s) IntraMuscular once  insulin lispro (ADMELOG) corrective regimen sliding scale   SubCutaneous three times a day before meals  insulin lispro (ADMELOG) corrective regimen sliding scale   SubCutaneous at bedtime  melatonin 3 milliGRAM(s) Oral at bedtime PRN  ondansetron Injectable 4 milliGRAM(s) IV Push every 8 hours PRN  pantoprazole Infusion 8 mG/Hr IV Continuous <Continuous>      PMHX/PSHX:  HTN (hypertension)    HLD (hyperlipidemia)    Type 2 diabetes mellitus    Gout    GERD (gastroesophageal reflux disease)    Prostate cancer    H/O prostatectomy    History of wrist fracture        Family history:  No pertinent family history in first degree relatives        Social History:   Denies ethanol use.  Denies illicit drug use.    ROS:     General:  No wt loss, fevers, chills, night sweats, fatigue,   Eyes:  Good vision, no reported pain  ENT:  No sore throat, pain, runny nose, dysphagia  CV:  No pain, palpitations, hypo/hypertension  Resp:  No dyspnea, cough, tachypnea, wheezing  GI:  See HPI  :  No pain, bleeding, incontinence, nocturia  Muscle:  No pain, weakness  Neuro:  No weakness, tingling, memory problems  Psych:  No fatigue, insomnia, mood problems, depression  Endocrine:  No polyuria, polydipsia, cold/heat intolerance  Heme:  No petechiae, ecchymosis, easy bruisability  Integumentary:  No rash, edema      PHYSICAL EXAM:     GENERAL:  Appears stated age, well-groomed, well-nourished, no distress  HEENT:  NC/AT,  conjunctivae anicteric, clear and pink,   NECK: supple, trachea midline  CHEST:  Full & symmetric excursion, no increased effort, breath sounds clear  HEART:  Regular rhythm, no JVD  ABDOMEN:  Soft, non-tender, non-distended, normoactive bowel sounds,  no masses , no hepatosplenomegaly  EXTREMITIES:  no cyanosis,clubbing or edema  SKIN:  No rash, erythema, or, ecchymoses, no jaundice  NEURO:  Alert, non-focal, no asterixis  PSYCH: Appropriate affect, oriented to place and time  RECTAL: Deferred      Vital Signs:  Vital Signs Last 24 Hrs  T(C): 36.6 (22 Sep 2022 04:45), Max: 36.6 (21 Sep 2022 14:45)  T(F): 97.8 (22 Sep 2022 04:45), Max: 97.9 (21 Sep 2022 14:45)  HR: 62 (22 Sep 2022 04:45) (62 - 79)  BP: 127/73 (22 Sep 2022 04:45) (119/72 - 150/74)  BP(mean): --  RR: 17 (22 Sep 2022 04:45) (17 - 20)  SpO2: 98% (22 Sep 2022 04:45) (98% - 98%)    Parameters below as of 22 Sep 2022 04:45  Patient On (Oxygen Delivery Method): room air      Daily Height in cm: 172.72 (21 Sep 2022 14:45)    Daily     LABS: Labs personally reviewed by me:                        9.4    7.39  )-----------( 215      ( 22 Sep 2022 07:21 )             29.8     09-22    138  |  102  |  26<H>  ----------------------------<  134<H>  4.2   |  24  |  1.07    Ca    9.5      22 Sep 2022 07:21  Mg     1.9     09-22    TPro  6.6  /  Alb  4.3  /  TBili  0.5  /  DBili  x   /  AST  16  /  ALT  18  /  AlkPhos  47  09-22    LIVER FUNCTIONS - ( 22 Sep 2022 07:21 )  Alb: 4.3 g/dL / Pro: 6.6 g/dL / ALK PHOS: 47 U/L / ALT: 18 U/L / AST: 16 U/L / GGT: x           PT/INR - ( 22 Sep 2022 09:45 )   PT: 11.8 sec;   INR: 1.03 ratio         PTT - ( 22 Sep 2022 09:45 )  PTT:26.3 sec        Imaging personally reviewed by me:

## 2022-09-22 NOTE — CONSULT NOTE ADULT - ASSESSMENT
67M w/ hx of CAD s/p mRCA lithotripsy and stent 01/2022, Prostate Ca, DM2, gout, HTN, HLD, GERD p/w melena. Pt endorses having 2-3 days of dark sticky stool with 2-3 episodes per a day    #Symptomatic Anemia-GIB  -pt seen in endo suite awaiting EGD  -optimized from CV perspective  -mgmt per GI  -antiplatelets on hold    #CAD s/p PCI  -s/p RCA stent 9 months ago  -resume at least one antiplatelet asap  -cont statin    70 minutes spent on total encounter; more than 50% of the visit was spent counseling and/or coordinating care by the attending physician.

## 2022-09-22 NOTE — PRE PROCEDURE NOTE - PRE PROCEDURE EVALUATION
Attending Physician:                        nalini    Procedure:egd/push    Indication for Procedure:melena  ________________________________________________________  PAST MEDICAL & SURGICAL HISTORY:  HTN (hypertension)      HLD (hyperlipidemia)      Type 2 diabetes mellitus      Gout      GERD (gastroesophageal reflux disease)      Prostate cancer      H/O prostatectomy      History of wrist fracture  surgery        ALLERGIES:  No Known Allergies    HOME MEDICATIONS:  allopurinol 100 mg oral tablet: 1 tab(s) orally once a day  amLODIPine 10 mg oral tablet: 1 tab(s) orally once a day  aspirin 81 mg oral tablet: orally once a day  atorvastatin 20 mg oral tablet: 1 tab(s) orally once a day  clopidogrel 75 mg oral tablet: 1 tab(s) orally once a day  losartan-hydrochlorothiazide 100 mg-12.5 mg oral tablet: 1 tab(s) orally once a day  metFORMIN 850 mg oral tablet: 1 tab(s) orally 2 times a day  omeprazole 40 mg oral delayed release capsule: 1 cap(s) orally once a day    AICD/PPM: [ ] yes   [ ] no    PERTINENT LAB DATA:                        9.4    7.39  )-----------( 215      ( 22 Sep 2022 07:21 )             29.8     09-22    138  |  102  |  26<H>  ----------------------------<  134<H>  4.2   |  24  |  1.07    Ca    9.5      22 Sep 2022 07:21  Mg     1.9     09-22    TPro  6.6  /  Alb  4.3  /  TBili  0.5  /  DBili  x   /  AST  16  /  ALT  18  /  AlkPhos  47  09-22    PT/INR - ( 22 Sep 2022 09:45 )   PT: 11.8 sec;   INR: 1.03 ratio         PTT - ( 22 Sep 2022 09:45 )  PTT:26.3 sec            PHYSICAL EXAMINATION:    Height (cm): 172.7  Weight (kg): 113.4  BMI (kg/m2): 38  BSA (m2): 2.25T(C): 36.6  HR: 75  BP: 158/74  RR: 22  SpO2: 97%    Constitutional: NAD  HEENT: PERRLA, EOMI,    Neck:  No JVD  Respiratory: CTAB/L  Cardiovascular: S1 and S2  Gastrointestinal: BS+, soft, NT/ND  Extremities: No peripheral edema  Neurological: A/O x 3, no focal deficits  Psychiatric: Normal mood, normal affect  Skin: No rashes    ASA Class: I [ ]  II [ ]  III [ x]  IV [ ]    COMMENTS:    The patient is a suitable candidate for the planned procedure unless box checked [ ]  No, explain:

## 2022-09-22 NOTE — ED ADULT NURSE NOTE - OBJECTIVE STATEMENT
67y Male complaining of  abdominal discomfort, dizziness x1 day, dark stool 3x in 1 day, hypotensive at home

## 2022-09-22 NOTE — PATIENT PROFILE ADULT - FUNCTIONAL ASSESSMENT - BASIC MOBILITY 6.
4-calculated by average/Not able to assess (calculate score using [facility] averaging method)

## 2022-09-22 NOTE — CONSULT NOTE ADULT - ASSESSMENT
67 year old male with history of CAD s/p mRCA lithotripsy and stent 01/2022, Prostate Ca, DM2, gout, HTN, HLD, GERD presenting with melena.    1. Melena  -planned for EGD today  -keep NPO  -hold AC  -PPI BID     2. CAD  -hx of mRCA lithotripsy and stent  -AC on hold         67 year old male with history of CAD s/p mRCA lithotripsy and stent 01/2022, Prostate Ca, DM2, gout, HTN, HLD, GERD presenting with melena.    1. Melena  -planned for EGD today  -keep NPO  -hold AC  -PPI BID     2. CAD  -hx of mRCA lithotripsy and stent  -AC on hold      Advanced care planning forms were discussed. Code status including forceful chest compressions, defibrillation and intubation were discussed. The risks benefits and alternatives to pertinent gastrointestinal procedures and interventions were discussed in detail and all questions were answered. Duration: 15 Minutes.  Attending supervision statement: I have personally seen and examined the patient. I fully participated in the care of this patient. I have made amendments to the documentation where necessary, and agree with the history, physical exam, and plan as outlined by the ACP.    [address]  Office: [phone]

## 2022-09-23 LAB
ALBUMIN SERPL ELPH-MCNC: 3.7 G/DL — SIGNIFICANT CHANGE UP (ref 3.3–5)
ALP SERPL-CCNC: 48 U/L — SIGNIFICANT CHANGE UP (ref 40–120)
ALT FLD-CCNC: 19 U/L — SIGNIFICANT CHANGE UP (ref 10–45)
ANION GAP SERPL CALC-SCNC: 10 MMOL/L — SIGNIFICANT CHANGE UP (ref 5–17)
AST SERPL-CCNC: 18 U/L — SIGNIFICANT CHANGE UP (ref 10–40)
BILIRUB SERPL-MCNC: 0.4 MG/DL — SIGNIFICANT CHANGE UP (ref 0.2–1.2)
BUN SERPL-MCNC: 16 MG/DL — SIGNIFICANT CHANGE UP (ref 7–23)
CALCIUM SERPL-MCNC: 9 MG/DL — SIGNIFICANT CHANGE UP (ref 8.4–10.5)
CHLORIDE SERPL-SCNC: 105 MMOL/L — SIGNIFICANT CHANGE UP (ref 96–108)
CO2 SERPL-SCNC: 23 MMOL/L — SIGNIFICANT CHANGE UP (ref 22–31)
CREAT SERPL-MCNC: 1.03 MG/DL — SIGNIFICANT CHANGE UP (ref 0.5–1.3)
EGFR: 79 ML/MIN/1.73M2 — SIGNIFICANT CHANGE UP
GLUCOSE BLDC GLUCOMTR-MCNC: 115 MG/DL — HIGH (ref 70–99)
GLUCOSE BLDC GLUCOMTR-MCNC: 133 MG/DL — HIGH (ref 70–99)
GLUCOSE BLDC GLUCOMTR-MCNC: 137 MG/DL — HIGH (ref 70–99)
GLUCOSE BLDC GLUCOMTR-MCNC: 212 MG/DL — HIGH (ref 70–99)
GLUCOSE SERPL-MCNC: 130 MG/DL — HIGH (ref 70–99)
HCT VFR BLD CALC: 28.3 % — LOW (ref 39–50)
HGB BLD-MCNC: 8.8 G/DL — LOW (ref 13–17)
MCHC RBC-ENTMCNC: 28.3 PG — SIGNIFICANT CHANGE UP (ref 27–34)
MCHC RBC-ENTMCNC: 31.1 GM/DL — LOW (ref 32–36)
MCV RBC AUTO: 91 FL — SIGNIFICANT CHANGE UP (ref 80–100)
NRBC # BLD: 0 /100 WBCS — SIGNIFICANT CHANGE UP (ref 0–0)
PLATELET # BLD AUTO: 220 K/UL — SIGNIFICANT CHANGE UP (ref 150–400)
POTASSIUM SERPL-MCNC: 3.8 MMOL/L — SIGNIFICANT CHANGE UP (ref 3.5–5.3)
POTASSIUM SERPL-SCNC: 3.8 MMOL/L — SIGNIFICANT CHANGE UP (ref 3.5–5.3)
PROT SERPL-MCNC: 6 G/DL — SIGNIFICANT CHANGE UP (ref 6–8.3)
RBC # BLD: 3.11 M/UL — LOW (ref 4.2–5.8)
RBC # FLD: 13.2 % — SIGNIFICANT CHANGE UP (ref 10.3–14.5)
SODIUM SERPL-SCNC: 138 MMOL/L — SIGNIFICANT CHANGE UP (ref 135–145)
WBC # BLD: 6.44 K/UL — SIGNIFICANT CHANGE UP (ref 3.8–10.5)
WBC # FLD AUTO: 6.44 K/UL — SIGNIFICANT CHANGE UP (ref 3.8–10.5)

## 2022-09-23 RX ORDER — PANTOPRAZOLE SODIUM 20 MG/1
40 TABLET, DELAYED RELEASE ORAL
Refills: 0 | Status: DISCONTINUED | OUTPATIENT
Start: 2022-09-23 | End: 2022-09-24

## 2022-09-23 RX ORDER — AMLODIPINE BESYLATE 2.5 MG/1
5 TABLET ORAL DAILY
Refills: 0 | Status: DISCONTINUED | OUTPATIENT
Start: 2022-09-24 | End: 2022-09-24

## 2022-09-23 RX ORDER — LOSARTAN POTASSIUM 100 MG/1
100 TABLET, FILM COATED ORAL DAILY
Refills: 0 | Status: DISCONTINUED | OUTPATIENT
Start: 2022-09-23 | End: 2022-09-24

## 2022-09-23 RX ORDER — ASPIRIN/CALCIUM CARB/MAGNESIUM 324 MG
81 TABLET ORAL DAILY
Refills: 0 | Status: DISCONTINUED | OUTPATIENT
Start: 2022-09-23 | End: 2022-09-24

## 2022-09-23 RX ADMIN — ATORVASTATIN CALCIUM 20 MILLIGRAM(S): 80 TABLET, FILM COATED ORAL at 07:43

## 2022-09-23 RX ADMIN — Medication 100 MILLIGRAM(S): at 07:42

## 2022-09-23 RX ADMIN — PANTOPRAZOLE SODIUM 40 MILLIGRAM(S): 20 TABLET, DELAYED RELEASE ORAL at 18:57

## 2022-09-23 RX ADMIN — LOSARTAN POTASSIUM 100 MILLIGRAM(S): 100 TABLET, FILM COATED ORAL at 13:38

## 2022-09-23 RX ADMIN — Medication 3 MILLIGRAM(S): at 00:43

## 2022-09-23 RX ADMIN — PANTOPRAZOLE SODIUM 10 MG/HR: 20 TABLET, DELAYED RELEASE ORAL at 13:41

## 2022-09-23 RX ADMIN — Medication 81 MILLIGRAM(S): at 11:58

## 2022-09-24 ENCOUNTER — TRANSCRIPTION ENCOUNTER (OUTPATIENT)
Age: 67
End: 2022-09-24

## 2022-09-24 VITALS
HEART RATE: 65 BPM | RESPIRATION RATE: 18 BRPM | DIASTOLIC BLOOD PRESSURE: 80 MMHG | TEMPERATURE: 98 F | OXYGEN SATURATION: 95 % | SYSTOLIC BLOOD PRESSURE: 122 MMHG

## 2022-09-24 LAB
ANION GAP SERPL CALC-SCNC: 13 MMOL/L — SIGNIFICANT CHANGE UP (ref 5–17)
BUN SERPL-MCNC: 17 MG/DL — SIGNIFICANT CHANGE UP (ref 7–23)
CALCIUM SERPL-MCNC: 9.1 MG/DL — SIGNIFICANT CHANGE UP (ref 8.4–10.5)
CHLORIDE SERPL-SCNC: 106 MMOL/L — SIGNIFICANT CHANGE UP (ref 96–108)
CO2 SERPL-SCNC: 22 MMOL/L — SIGNIFICANT CHANGE UP (ref 22–31)
CREAT SERPL-MCNC: 1.18 MG/DL — SIGNIFICANT CHANGE UP (ref 0.5–1.3)
EGFR: 67 ML/MIN/1.73M2 — SIGNIFICANT CHANGE UP
GLUCOSE BLDC GLUCOMTR-MCNC: 111 MG/DL — HIGH (ref 70–99)
GLUCOSE BLDC GLUCOMTR-MCNC: 122 MG/DL — HIGH (ref 70–99)
GLUCOSE SERPL-MCNC: 107 MG/DL — HIGH (ref 70–99)
HCT VFR BLD CALC: 28.7 % — LOW (ref 39–50)
HGB BLD-MCNC: 9.1 G/DL — LOW (ref 13–17)
MCHC RBC-ENTMCNC: 28.7 PG — SIGNIFICANT CHANGE UP (ref 27–34)
MCHC RBC-ENTMCNC: 31.7 GM/DL — LOW (ref 32–36)
MCV RBC AUTO: 90.5 FL — SIGNIFICANT CHANGE UP (ref 80–100)
NRBC # BLD: 0 /100 WBCS — SIGNIFICANT CHANGE UP (ref 0–0)
PLATELET # BLD AUTO: 242 K/UL — SIGNIFICANT CHANGE UP (ref 150–400)
POTASSIUM SERPL-MCNC: 3.8 MMOL/L — SIGNIFICANT CHANGE UP (ref 3.5–5.3)
POTASSIUM SERPL-SCNC: 3.8 MMOL/L — SIGNIFICANT CHANGE UP (ref 3.5–5.3)
RBC # BLD: 3.17 M/UL — LOW (ref 4.2–5.8)
RBC # FLD: 13.1 % — SIGNIFICANT CHANGE UP (ref 10.3–14.5)
SODIUM SERPL-SCNC: 141 MMOL/L — SIGNIFICANT CHANGE UP (ref 135–145)
WBC # BLD: 6.93 K/UL — SIGNIFICANT CHANGE UP (ref 3.8–10.5)
WBC # FLD AUTO: 6.93 K/UL — SIGNIFICANT CHANGE UP (ref 3.8–10.5)

## 2022-09-24 PROCEDURE — 80048 BASIC METABOLIC PNL TOTAL CA: CPT

## 2022-09-24 PROCEDURE — 80053 COMPREHEN METABOLIC PANEL: CPT

## 2022-09-24 PROCEDURE — 85730 THROMBOPLASTIN TIME PARTIAL: CPT

## 2022-09-24 PROCEDURE — 99285 EMERGENCY DEPT VISIT HI MDM: CPT

## 2022-09-24 PROCEDURE — 88305 TISSUE EXAM BY PATHOLOGIST: CPT

## 2022-09-24 PROCEDURE — 87635 SARS-COV-2 COVID-19 AMP PRB: CPT

## 2022-09-24 PROCEDURE — 85610 PROTHROMBIN TIME: CPT

## 2022-09-24 PROCEDURE — 86900 BLOOD TYPING SEROLOGIC ABO: CPT

## 2022-09-24 PROCEDURE — 86901 BLOOD TYPING SEROLOGIC RH(D): CPT

## 2022-09-24 PROCEDURE — 36415 COLL VENOUS BLD VENIPUNCTURE: CPT

## 2022-09-24 PROCEDURE — 82962 GLUCOSE BLOOD TEST: CPT

## 2022-09-24 PROCEDURE — 83735 ASSAY OF MAGNESIUM: CPT

## 2022-09-24 PROCEDURE — 82272 OCCULT BLD FECES 1-3 TESTS: CPT

## 2022-09-24 PROCEDURE — 88341 IMHCHEM/IMCYTCHM EA ADD ANTB: CPT

## 2022-09-24 PROCEDURE — 85027 COMPLETE CBC AUTOMATED: CPT

## 2022-09-24 PROCEDURE — 83036 HEMOGLOBIN GLYCOSYLATED A1C: CPT

## 2022-09-24 PROCEDURE — 86803 HEPATITIS C AB TEST: CPT

## 2022-09-24 PROCEDURE — 86850 RBC ANTIBODY SCREEN: CPT

## 2022-09-24 PROCEDURE — 85025 COMPLETE CBC W/AUTO DIFF WBC: CPT

## 2022-09-24 RX ORDER — ASPIRIN/CALCIUM CARB/MAGNESIUM 324 MG
1 TABLET ORAL
Qty: 0 | Refills: 0 | DISCHARGE
Start: 2022-09-24

## 2022-09-24 RX ORDER — PANTOPRAZOLE SODIUM 20 MG/1
1 TABLET, DELAYED RELEASE ORAL
Qty: 60 | Refills: 0
Start: 2022-09-24 | End: 2022-10-23

## 2022-09-24 RX ORDER — ALLOPURINOL 300 MG
1 TABLET ORAL
Qty: 0 | Refills: 0 | DISCHARGE
Start: 2022-09-24

## 2022-09-24 RX ORDER — AMLODIPINE BESYLATE 2.5 MG/1
1 TABLET ORAL
Qty: 30 | Refills: 0
Start: 2022-09-24 | End: 2022-10-23

## 2022-09-24 RX ORDER — LANOLIN ALCOHOL/MO/W.PET/CERES
1 CREAM (GRAM) TOPICAL
Qty: 0 | Refills: 0 | DISCHARGE
Start: 2022-09-24

## 2022-09-24 RX ADMIN — AMLODIPINE BESYLATE 5 MILLIGRAM(S): 2.5 TABLET ORAL at 05:25

## 2022-09-24 RX ADMIN — LOSARTAN POTASSIUM 100 MILLIGRAM(S): 100 TABLET, FILM COATED ORAL at 05:25

## 2022-09-24 RX ADMIN — Medication 100 MILLIGRAM(S): at 12:13

## 2022-09-24 RX ADMIN — Medication 81 MILLIGRAM(S): at 12:13

## 2022-09-24 RX ADMIN — PANTOPRAZOLE SODIUM 40 MILLIGRAM(S): 20 TABLET, DELAYED RELEASE ORAL at 05:25

## 2022-09-24 NOTE — DISCHARGE NOTE PROVIDER - CARE PROVIDER_API CALL
Daniel Rangel  INTERNAL MEDICINE  [address] [facility], [address]  [place] [address]  Phone: [phone]  Fax: [phone]  Follow Up Time:     Sunil Resendez (DO)  Cardiovascular Disease; Internal Medicine  [address] [facility], [address]  [place] [address]  Phone: [phone]  Fax: [phone]  Follow Up Time:     Qasim Samuel  GASTROENTEROLOGY  [address] [place] [address], [facility] [address]  [place] [address]  Phone: [phone]  Fax: [phone]  Follow Up Time:

## 2022-09-24 NOTE — PROGRESS NOTE ADULT - PROVIDER SPECIALTY LIST ADULT
Gastroenterology
Internal Medicine
Internal Medicine
Cardiology
Cardiology
Gastroenterology
Internal Medicine

## 2022-09-24 NOTE — DISCHARGE NOTE PROVIDER - NSDCCPCAREPLAN_GEN_ALL_CORE_FT
PRINCIPAL DISCHARGE DIAGNOSIS  Diagnosis: Acute upper GI bleed  Assessment and Plan of Treatment: Plavix on Hold   c/w Pantoprazole twice a day      SECONDARY DISCHARGE DIAGNOSES  Diagnosis: Essential hypertension  Assessment and Plan of Treatment: Follow up with your medical doctor to establish long term blood pressure treatment goals.      Diagnosis: Diabetes mellitus  Assessment and Plan of Treatment: HgA1C this admission.  Make sure you get your HgA1c checked every three months.  If you take oral diabetes medications, check your blood glucose two times a day.  If you take insulin, check your blood glucose before meals and at bedtime.  It's important not to skip any meals.  Keep a log of your blood glucose results and always take it with you to your doctor appointments.  Keep a list of your current medications including injectables and over the counter medications and bring this medication list with you to all your doctor appointments.  If you have not seen your opthalmologist this year call for appointment.  Check your feet daily for redness, sores, or openings. Do not self treat. If no improvement in two days call your primary care physician for an appointment.  Low blood sugar (hypoglycemia) is a blood sugar below 70mg/dl. Check your blood sugar if you feel signs/symptoms of hypoglycemia. If your blood sugar is below 70 take 15 grams of carbohydrates (ex 4 oz of apple juice, 3-4 glucosr tablets, or 4-6 oz of regular soda) wait 15 minutes and repeat blood sugar to make sure it comes up above 70.  If your blood sugar is above 70 and you are due for a meal, have a meal.  If you are not due for a meal have a snack.  This snack helps keeps your blood sugar at a safe range.      Diagnosis: CAD (coronary artery disease)  Assessment and Plan of Treatment: c/w ASA   No plavix   Please  follow up with DR Fall

## 2022-09-24 NOTE — DISCHARGE NOTE NURSING/CASE MANAGEMENT/SOCIAL WORK - PATIENT PORTAL LINK FT
You can access the FollowMyHealth Patient Portal offered by [facility] by registering at the following website: [email or web address]. By joining [facility]’s FollowMyHealth portal, you will also be able to view your health information using other applications (apps) compatible with our system.

## 2022-09-24 NOTE — DISCHARGE NOTE PROVIDER - NSDCMRMEDTOKEN_GEN_ALL_CORE_FT
allopurinol 100 mg oral tablet: 1 tab(s) orally once a day  amLODIPine 5 mg oral tablet: 1 tab(s) orally once a day  aspirin 81 mg oral delayed release tablet: 1 tab(s) orally once a day  losartan-hydrochlorothiazide 100 mg-12.5 mg oral tablet: 1 tab(s) orally once a day  melatonin 3 mg oral tablet: 1 tab(s) orally once a day (at bedtime), As needed, Insomnia  metFORMIN 850 mg oral tablet: 1 tab(s) orally 2 times a day  pantoprazole 40 mg oral delayed release tablet: 1 tab(s) orally 2 times a day

## 2022-09-24 NOTE — DISCHARGE NOTE PROVIDER - HOSPITAL COURSE
67M w/ hx of CAD s/p mRCA lithotripsy and stent 01/2022, Prostate Ca, DM2, gout, HTN, HLD, GERD p/w melena    Acute blood loss anemia 2/2 below  UGIB Melena.   -s/p EGD- GI Bleed due to duodenal ulcers  -Hgb 10--> 9 , rpt stable   -switch to PPI PO BID; ok with regular diet; ok to give asa per GI  -GI consult-- per GI for EGD today; Medically optimized     CAD (coronary artery disease).   -s/p mRCA stent 01/2022. Has been on DAPT for >6 months.   -Cont. atorvastatin; cardio recs noted -- start 1 aplt asap-- started asa ok per gi   -cardio - follows Dr Resendez outpatient -- HOLD plavix f/u outpatient with Dr Resendez     Diabetes mellitus.   -On metformin at home  -Fingersticks and sliding scale  -A1c. 7.5    Essential hypertension.   ·  Plan: -Trend BP  -start home meds

## 2022-09-24 NOTE — DISCHARGE NOTE PROVIDER - PROVIDER TOKENS
PROVIDER:[TOKEN:[[id number]]],PROVIDER:[TOKEN:[[id number]]],PROVIDER:[TOKEN:[[id number]]]

## 2022-09-24 NOTE — DISCHARGE NOTE PROVIDER - NSDCFUADDAPPT_GEN_ALL_CORE_FT
APPTS ARE READY TO BE MADE: [x ] YES    Best Family or Patient Contact (if needed):    Additional Information about above appointments (if needed):    1:Please  follow up with DR Rangel   2: Please  follow up with DR Resendez   3: Please follow up with DR Samuel  for a repeat Endoscopy to assess healing of your gastric ulcer     Other comments or requests:    APPTS ARE READY TO BE MADE: [x ] YES    Best Family or Patient Contact (if needed):    Additional Information about above appointments (if needed):    1:Please  follow up with DR Rangel   2: Please  follow up with DR Resendez   3: Please follow up with DR Samuel  for a repeat Endoscopy to assess healing of your gastric ulcer     Other comments or requests:   Patient was provided with follow up request details for Dr. Daniel Rangel, Dr. Sunil Garcia, Dr. Qasim Samuel and was advised to call to schedule follow up within specified time frame.

## 2022-09-24 NOTE — PROGRESS NOTE ADULT - ASSESSMENT
67M w/ hx of CAD s/p mRCA lithotripsy and stent 01/2022, Prostate Ca, DM2, gout, HTN, HLD, GERD p/w melena    Acute blood loss anemia 2/2 below  UGIB Melena.   -s/p EGD- GI Bleed due to duodenal ulcers  -Hgb 10--> 9 , rpt stable   -switch to PPI PO BID; ok with regular diet; ok to give asa per GI  -GI consult-- per GI for EGD today; Medically optimized     CAD (coronary artery disease).   -s/p mRCA stent 01/2022. Has been on DAPT for >6 months.   -Cont. atorvastatin; cardio recs noted -- start 1 aplt asap-- started asa ok per gi   -cardio - follows Dr Resendez outpatient -- HOLD plavix f/u outpatient with Dr Resendez     Diabetes mellitus.   -On metformin at home  -Fingersticks and sliding scale  -A1c. 7.5    Essential hypertension.   ·  Plan: -Trend BP  -start home meds       ·  Problem: DVT prophylaxis.   ·  Plan: -SCDs.    Dispo:  Dc today with outpatient follow up.
67 year old male with history of CAD s/p mRCA lithotripsy and stent 01/2022, Prostate Ca, DM2, gout, HTN, HLD, GERD presenting with melena.    1. Melena  -s/p EGD 9/22   -GI Bleed due to duodenal ulcers  -PPI BID  -May resume aspirin today. Ideally would not continue Plavix, defer to cards   -Tolerating regular diet  -Consider rpt Endoscopy with Dr. Samuel for gastric ulcer healing.    2. CAD  -hx of mRCA lithotripsy and stent  -AC on hold        Attending supervision statement: I have personally seen and examined the patient. I fully participated in the care of this patient. I have made amendments to the documentation where necessary, and agree with the history, physical exam, and plan as outlined by the ACP.    [address]  Office: [phone]            
67M w/ hx of CAD s/p mRCA lithotripsy and stent 01/2022, Prostate Ca, DM2, gout, HTN, HLD, GERD p/w melena. Pt endorses having 2-3 days of dark sticky stool with 2-3 episodes per a day    #Symptomatic Anemia-GIB  -s/p EGD duodenal ulcers seen  -resume asa per GI  -hold plavix for now    #CAD s/p PCI  -s/p RCA stent 9 months ago  -resume asa as above  -cont statin    35 minutes spent on total encounter; more than 50% of the visit was spent counseling and/or coordinating care by the attending physician.  
67M w/ hx of CAD s/p mRCA lithotripsy and stent 01/2022, Prostate Ca, DM2, gout, HTN, HLD, GERD p/w melena. Pt endorses having 2-3 days of dark sticky stool with 2-3 episodes per a day    #Symptomatic Anemia-GIB  -s/p EGD duodenal ulcers seen  -resume asa per GI  -hold plavix for now    #CAD s/p PCI  -s/p RCA stent 9 months ago  -resume asa as above  -cont statin    35 minutes spent on total encounter; more than 50% of the visit was spent counseling and/or coordinating care by the attending physician.  
67 year old male with history of CAD s/p mRCA lithotripsy and stent 01/2022, Prostate Ca, DM2, gout, HTN, HLD, GERD presenting with melena.    1.GI bleed dueo to duodenal and gastric ulcers  -no GI objection to hospital d/c on PPI BID, avoid NSAIDs, ok w ASA  -follow with Dr. Samuel    2. CAD  -hx of mRCA lithotripsy and stent  -AC on hold        Attending supervision statement: I have personally seen and examined the patient. I fully participated in the care of this patient. I have made amendments to the documentation where necessary, and agree with the history, physical exam, and plan as outlined by the ACP.    [address]  Office: [phone]            
67M w/ hx of CAD s/p mRCA lithotripsy and stent 01/2022, Prostate Ca, DM2, gout, HTN, HLD, GERD p/w melena    UGIB Melena.   -Hgb 10--> 9 , rpt stable   -C/w  PPI IV BID  -NPO, start LR @ 75 cc/hr   -GI consult-- per GI for EGD today; Medically optimized     CAD (coronary artery disease).   -s/p mRCA stent 01/2022. Has been on DAPT for >6 months. Given current GIB will hold DAPT for now  -Holding asa and plavix overnight  -Cont. atorvastatin  -cardio consult follow up - follows Dr Resendez     Diabetes mellitus.   -On metformin at home  -Fingersticks and sliding scale  -A1c.    Essential hypertension.   ·  Plan: -Trend BP  -Hold HCTZ, losartan and amlodipine for now in setting of GIB and recent hypotensive episode.      ·  Problem: DVT prophylaxis.   ·  Plan: -SCDs.    Dispo: pedning EGD and clinical improvement, likely dc in next 24-48 hours/. 
67M w/ hx of CAD s/p mRCA lithotripsy and stent 01/2022, Prostate Ca, DM2, gout, HTN, HLD, GERD p/w melena    UGIB Melena.   -s/p EGD- GI Bleed due to duodenal ulcers  -Hgb 10--> 9 , rpt stable   -C/w  PPI IV gtt; ok with regular diet; ok to give asa per GI  -GI consult-- per GI for EGD today; Medically optimized     CAD (coronary artery disease).   -s/p mRCA stent 01/2022. Has been on DAPT for >6 months.   -Cont. atorvastatin; cardio recs noted -- start 1 aplt asap-- started asa ok per gi   -cardio - follows Dr Resendez outpatient     Diabetes mellitus.   -On metformin at home  -Fingersticks and sliding scale  -A1c. 7.5    Essential hypertension.   ·  Plan: -Trend BP  - BP starting to uptrend--> start losartan now; start amlodipine tomorrow if bp allows       ·  Problem: DVT prophylaxis.   ·  Plan: -SCDs.    Dispo:  likely dc in next 24-48 hours. hgb and bp follow up.

## 2022-09-24 NOTE — DISCHARGE NOTE PROVIDER - CARE PROVIDERS DIRECT ADDRESSES
,DirectAddress_[email or web address]

## 2022-09-24 NOTE — PROGRESS NOTE ADULT - SUBJECTIVE AND OBJECTIVE BOX
CARDIOLOGY FOLLOW UP - Dr. Upton  Date of Service: 9/23/2022  CC: s/p EGD, no cp/sob, NSR on tele    Review of Systems:  Constitutional: No fever, weight loss, or fatigue  Respiratory: No cough, wheezing, or hemoptysis, no shortness of breath  Cardiovascular: No chest pain, palpitations, passing out, dizziness, or leg swelling  Gastrointestinal: No abd or epigastric pain. No nausea, vomiting, or hematemesis; no diarrhea or consiptaiton, no melena or hematochezia  Vascular: No edema     TELEMETRY:    PHYSICAL EXAM:  T(C): 36.5 (09-23-22 @ 12:17), Max: 36.6 (09-22-22 @ 16:40)  HR: 71 (09-23-22 @ 12:17) (59 - 75)  BP: 130/74 (09-23-22 @ 12:17) (128/70 - 158/74)  RR: 18 (09-23-22 @ 12:17) (15 - 22)  SpO2: 93% (09-23-22 @ 12:17) (93% - 97%)  Wt(kg): --  I&O's Summary    22 Sep 2022 07:01  -  23 Sep 2022 07:00  --------------------------------------------------------  IN: 1110 mL / OUT: 400 mL / NET: 710 mL        Appearance: Normal	  Cardiovascular: Normal S1 S2,RRR, No JVD, No murmurs  Respiratory: Lungs clear to auscultation	  Gastrointestinal:  Soft, Non-tender, + BS	  Extremities: Normal range of motion, No clubbing, cyanosis or edema  Vascular: Peripheral pulses palpable 2+ bilaterally       Home Medications:  allopurinol 100 mg oral tablet: 1 tab(s) orally once a day (21 Sep 2022 21:42)  amLODIPine 10 mg oral tablet: 1 tab(s) orally once a day (21 Sep 2022 21:42)  aspirin 81 mg oral tablet: orally once a day (21 Sep 2022 21:42)  atorvastatin 20 mg oral tablet: 1 tab(s) orally once a day (21 Sep 2022 21:42)  clopidogrel 75 mg oral tablet: 1 tab(s) orally once a day (21 Sep 2022 21:42)  losartan-hydrochlorothiazide 100 mg-12.5 mg oral tablet: 1 tab(s) orally once a day (21 Sep 2022 21:42)  metFORMIN 850 mg oral tablet: 1 tab(s) orally 2 times a day (21 Sep 2022 21:42)  omeprazole 40 mg oral delayed release capsule: 1 cap(s) orally once a day (21 Sep 2022 21:42)        MEDICATIONS  (STANDING):  allopurinol 100 milliGRAM(s) Oral daily  aspirin enteric coated 81 milliGRAM(s) Oral daily  atorvastatin 20 milliGRAM(s) Oral daily  dextrose 5%. 1000 milliLiter(s) (50 mL/Hr) IV Continuous <Continuous>  dextrose 5%. 1000 milliLiter(s) (100 mL/Hr) IV Continuous <Continuous>  dextrose 50% Injectable 25 Gram(s) IV Push once  dextrose 50% Injectable 12.5 Gram(s) IV Push once  dextrose 50% Injectable 25 Gram(s) IV Push once  glucagon  Injectable 1 milliGRAM(s) IntraMuscular once  influenza  Vaccine (HIGH DOSE) 0.7 milliLiter(s) IntraMuscular once  insulin lispro (ADMELOG) corrective regimen sliding scale   SubCutaneous three times a day before meals  insulin lispro (ADMELOG) corrective regimen sliding scale   SubCutaneous at bedtime  losartan 100 milliGRAM(s) Oral daily  pantoprazole Infusion 8 mG/Hr (10 mL/Hr) IV Continuous <Continuous>        EKG:  RADIOLOGY:  DIAGNOSTIC TESTING:  [ ] Echocardiogram:  [ ] Catherterization:  [ ] Stress Test:  OTHER:     LABS:	 	                          8.8    6.44  )-----------( 220      ( 23 Sep 2022 06:55 )             28.3     09-23    138  |  105  |  16  ----------------------------<  130<H>  3.8   |  23  |  1.03    Ca    9.0      23 Sep 2022 06:56  Mg     1.9     09-22    TPro  6.0  /  Alb  3.7  /  TBili  0.4  /  DBili  x   /  AST  18  /  ALT  19  /  AlkPhos  48  09-23      PT/INR - ( 22 Sep 2022 09:45 )   PT: 11.8 sec;   INR: 1.03 ratio         PTT - ( 22 Sep 2022 09:45 )  PTT:26.3 sec    CARDIAC MARKERS:                  
Patient is a 67y old  Male who presents with a chief complaint of Dark stools (22 Sep 2022 10:47)      SUBJECTIVE / OVERNIGHT EVENTS:  Patient seen and examined.   Last episode of melena was 11pm last night.       Vital Signs Last 24 Hrs  T(C): 36.5 (22 Sep 2022 11:54), Max: 36.6 (21 Sep 2022 14:45)  T(F): 97.7 (22 Sep 2022 11:54), Max: 97.9 (21 Sep 2022 14:45)  HR: 61 (22 Sep 2022 11:54) (61 - 79)  BP: 129/69 (22 Sep 2022 11:54) (119/72 - 150/74)  BP(mean): --  RR: 18 (22 Sep 2022 11:54) (17 - 20)  SpO2: 97% (22 Sep 2022 11:54) (97% - 98%)    Parameters below as of 22 Sep 2022 11:54  Patient On (Oxygen Delivery Method): room air      I&O's Summary      PHYSICAL EXAM:  GENERAL: NAD, AAOx3  HEAD:  Atraumatic, Normocephalic  EYES: EOMI; conjunctiva and sclera clear  NECK: Supple, No JVD, No LAD  CHEST/LUNG: B/L air entry; No wheezes, rales or rhonci   HEART: Regular rate and rhythm; No murmurs, rubs, or gallops  ABDOMEN: Soft, Nontender, Nondistended; Bowel sounds present  EXTREMITIES:  2+ Peripheral Pulses, No clubbing, cyanosis, or edema  SKIN: No rashes or lesions    LABS:                        9.4    7.39  )-----------( 215      ( 22 Sep 2022 07:21 )             29.8     09-22    138  |  102  |  26<H>  ----------------------------<  134<H>  4.2   |  24  |  1.07    Ca    9.5      22 Sep 2022 07:21  Mg     1.9     09-22    TPro  6.6  /  Alb  4.3  /  TBili  0.5  /  DBili  x   /  AST  16  /  ALT  18  /  AlkPhos  47  09-22    PT/INR - ( 22 Sep 2022 09:45 )   PT: 11.8 sec;   INR: 1.03 ratio         PTT - ( 22 Sep 2022 09:45 )  PTT:26.3 sec  CAPILLARY BLOOD GLUCOSE      POCT Blood Glucose.: 155 mg/dL (22 Sep 2022 09:28)  POCT Blood Glucose.: 141 mg/dL (21 Sep 2022 23:30)            RADIOLOGY & ADDITIONAL TESTS:    Imaging Personally Reviewed:  [x] YES  [ ] NO    Consultant(s) Notes Reviewed:  [x] YES  [ ] NO      MEDICATIONS  (STANDING):  allopurinol 100 milliGRAM(s) Oral daily  atorvastatin 20 milliGRAM(s) Oral daily  dextrose 5%. 1000 milliLiter(s) (50 mL/Hr) IV Continuous <Continuous>  dextrose 5%. 1000 milliLiter(s) (100 mL/Hr) IV Continuous <Continuous>  dextrose 50% Injectable 25 Gram(s) IV Push once  dextrose 50% Injectable 12.5 Gram(s) IV Push once  dextrose 50% Injectable 25 Gram(s) IV Push once  glucagon  Injectable 1 milliGRAM(s) IntraMuscular once  insulin lispro (ADMELOG) corrective regimen sliding scale   SubCutaneous three times a day before meals  insulin lispro (ADMELOG) corrective regimen sliding scale   SubCutaneous at bedtime  lactated ringers. 1000 milliLiter(s) (75 mL/Hr) IV Continuous <Continuous>  pantoprazole Infusion 8 mG/Hr (10 mL/Hr) IV Continuous <Continuous>    MEDICATIONS  (PRN):  acetaminophen     Tablet .. 650 milliGRAM(s) Oral every 6 hours PRN Temp greater or equal to 38C (100.4F), Mild Pain (1 - 3)  dextrose Oral Gel 15 Gram(s) Oral once PRN Blood Glucose LESS THAN 70 milliGRAM(s)/deciliter  melatonin 3 milliGRAM(s) Oral at bedtime PRN Insomnia  ondansetron Injectable 4 milliGRAM(s) IV Push every 8 hours PRN Nausea and/or Vomiting      Care Discussed with Consultants/Other Providers [x] YES  [ ] NO    HEALTH ISSUES - PROBLEM Dx:  Suspected deep vein thrombosis (DVT)    Suspected pulmonary embolism    Melena    CAD (coronary artery disease)    Diabetes mellitus    Essential hypertension    DVT prophylaxis        
Patient is a 67y old  Male who presents with a chief complaint of Dark stools (24 Sep 2022 10:03)      SUBJECTIVE / OVERNIGHT EVENTS:  Patient seen and examined.   Doing well, no complaints.       Vital Signs Last 24 Hrs  T(C): 36.6 (24 Sep 2022 11:51), Max: 36.7 (23 Sep 2022 20:38)  T(F): 97.8 (24 Sep 2022 11:51), Max: 98.1 (23 Sep 2022 20:38)  HR: 65 (24 Sep 2022 11:51) (60 - 65)  BP: 122/80 (24 Sep 2022 11:51) (113/69 - 153/80)  BP(mean): --  RR: 18 (24 Sep 2022 11:51) (18 - 18)  SpO2: 95% (24 Sep 2022 11:51) (93% - 95%)    Parameters below as of 24 Sep 2022 11:51  Patient On (Oxygen Delivery Method): room air      I&O's Summary    23 Sep 2022 07:01  -  24 Sep 2022 07:00  --------------------------------------------------------  IN: 1389 mL / OUT: 0 mL / NET: 1389 mL        PHYSICAL EXAM:  GENERAL: NAD, AAOx3  HEAD:  Atraumatic, Normocephalic  EYES: EOMI; conjunctiva and sclera clear  NECK: Supple, No JVD, No LAD  CHEST/LUNG: B/L air entry; No wheezes, rales or rhonci   HEART: Regular rate and rhythm; No murmurs, rubs, or gallops  ABDOMEN: Soft, Nontender, Nondistended; Bowel sounds present  EXTREMITIES:  2+ Peripheral Pulses, No clubbing, cyanosis, or edema  SKIN: No rashes or lesions    LABS:                        9.1    6.93  )-----------( 242      ( 24 Sep 2022 07:17 )             28.7     09-24    141  |  106  |  17  ----------------------------<  107<H>  3.8   |  22  |  1.18    Ca    9.1      24 Sep 2022 07:16    TPro  6.0  /  Alb  3.7  /  TBili  0.4  /  DBili  x   /  AST  18  /  ALT  19  /  AlkPhos  48  09-23      CAPILLARY BLOOD GLUCOSE      POCT Blood Glucose.: 122 mg/dL (24 Sep 2022 09:19)  POCT Blood Glucose.: 212 mg/dL (23 Sep 2022 21:08)  POCT Blood Glucose.: 115 mg/dL (23 Sep 2022 17:22)  POCT Blood Glucose.: 137 mg/dL (23 Sep 2022 13:24)            RADIOLOGY & ADDITIONAL TESTS:    Imaging Personally Reviewed:  [x] YES  [ ] NO    Consultant(s) Notes Reviewed:  [x] YES  [ ] NO      MEDICATIONS  (STANDING):  allopurinol 100 milliGRAM(s) Oral daily  amLODIPine   Tablet 5 milliGRAM(s) Oral daily  aspirin enteric coated 81 milliGRAM(s) Oral daily  atorvastatin 20 milliGRAM(s) Oral daily  dextrose 5%. 1000 milliLiter(s) (50 mL/Hr) IV Continuous <Continuous>  dextrose 5%. 1000 milliLiter(s) (100 mL/Hr) IV Continuous <Continuous>  dextrose 50% Injectable 25 Gram(s) IV Push once  dextrose 50% Injectable 12.5 Gram(s) IV Push once  dextrose 50% Injectable 25 Gram(s) IV Push once  glucagon  Injectable 1 milliGRAM(s) IntraMuscular once  influenza  Vaccine (HIGH DOSE) 0.7 milliLiter(s) IntraMuscular once  insulin lispro (ADMELOG) corrective regimen sliding scale   SubCutaneous three times a day before meals  insulin lispro (ADMELOG) corrective regimen sliding scale   SubCutaneous at bedtime  losartan 100 milliGRAM(s) Oral daily  pantoprazole    Tablet 40 milliGRAM(s) Oral two times a day    MEDICATIONS  (PRN):  acetaminophen     Tablet .. 650 milliGRAM(s) Oral every 6 hours PRN Temp greater or equal to 38C (100.4F), Mild Pain (1 - 3)  dextrose Oral Gel 15 Gram(s) Oral once PRN Blood Glucose LESS THAN 70 milliGRAM(s)/deciliter  melatonin 3 milliGRAM(s) Oral at bedtime PRN Insomnia  ondansetron Injectable 4 milliGRAM(s) IV Push every 8 hours PRN Nausea and/or Vomiting      Care Discussed with Consultants/Other Providers [x] YES  [ ] NO    HEALTH ISSUES - PROBLEM Dx:  Suspected deep vein thrombosis (DVT)    Suspected pulmonary embolism    Melena    CAD (coronary artery disease)    Diabetes mellitus    Essential hypertension    DVT prophylaxis        
  Chief Complaint:  Patient is a 67y old  Male who presents with a chief complaint of Dark stools (22 Sep 2022 14:03)      Date of service 09-23-22 @ 11:58      Interval Events:   Patient seen and examined.   No abdominal pain, n/v/d/c.  s/p EGD yesterday    Hospital Medications:  acetaminophen     Tablet .. 650 milliGRAM(s) Oral every 6 hours PRN  allopurinol 100 milliGRAM(s) Oral daily  aspirin enteric coated 81 milliGRAM(s) Oral daily  atorvastatin 20 milliGRAM(s) Oral daily  dextrose 5%. 1000 milliLiter(s) IV Continuous <Continuous>  dextrose 5%. 1000 milliLiter(s) IV Continuous <Continuous>  dextrose 50% Injectable 25 Gram(s) IV Push once  dextrose 50% Injectable 12.5 Gram(s) IV Push once  dextrose 50% Injectable 25 Gram(s) IV Push once  dextrose Oral Gel 15 Gram(s) Oral once PRN  glucagon  Injectable 1 milliGRAM(s) IntraMuscular once  influenza  Vaccine (HIGH DOSE) 0.7 milliLiter(s) IntraMuscular once  insulin lispro (ADMELOG) corrective regimen sliding scale   SubCutaneous three times a day before meals  insulin lispro (ADMELOG) corrective regimen sliding scale   SubCutaneous at bedtime  lactated ringers. 1000 milliLiter(s) IV Continuous <Continuous>  melatonin 3 milliGRAM(s) Oral at bedtime PRN  ondansetron Injectable 4 milliGRAM(s) IV Push every 8 hours PRN  pantoprazole Infusion 8 mG/Hr IV Continuous <Continuous>        Review of Systems:  General:  No wt loss, fevers, chills, night sweats, fatigue,   Eyes:  Good vision, no reported pain  ENT:  No sore throat, pain, runny nose, dysphagia  CV:  No pain, palpitations, hypo/hypertension  Resp:  No dyspnea, cough, tachypnea, wheezing  GI:  See HPI  :  No pain, bleeding, incontinence, nocturia  Muscle:  No pain, weakness  Neuro:  No weakness, tingling, memory problems  Psych:  No fatigue, insomnia, mood problems, depression  Endocrine:  No polyuria, polydipsia, cold/heat intolerance  Heme:  No petechiae, ecchymosis, easy bruisability  Integumentary:  No rash, edema    PHYSICAL EXAM:   Vital Signs:  Vital Signs Last 24 Hrs  T(C): 36.5 (23 Sep 2022 04:25), Max: 36.6 (22 Sep 2022 13:31)  T(F): 97.7 (23 Sep 2022 04:25), Max: 97.8 (22 Sep 2022 13:31)  HR: 66 (23 Sep 2022 04:25) (59 - 75)  BP: 135/72 (23 Sep 2022 04:25) (128/70 - 158/74)  BP(mean): --  RR: 18 (23 Sep 2022 04:25) (15 - 22)  SpO2: 94% (23 Sep 2022 04:25) (94% - 97%)    Parameters below as of 23 Sep 2022 04:25  Patient On (Oxygen Delivery Method): room air      Daily Height in cm: 172.72 (22 Sep 2022 16:40)    Daily       PHYSICAL EXAM:     GENERAL:  Appears stated age, well-groomed, well-nourished, no distress  HEENT:  NC/AT,  conjunctivae anicteric, clear and pink,   NECK: supple, trachea midline  CHEST:  Full & symmetric excursion, no increased effort, breath sounds clear  HEART:  Regular rhythm, no JVD  ABDOMEN:  Soft, non-tender, non-distended, normoactive bowel sounds,  no masses , no hepatosplenomegaly  EXTREMITIES:  no cyanosis,clubbing or edema  SKIN:  No rash, erythema, or, ecchymoses, no jaundice  NEURO:  Alert, non-focal, no asterixis  PSYCH: Appropriate affect, oriented to place and time  RECTAL: Deferred      LABS Personally reviewed by me:                        8.8    6.44  )-----------( 220      ( 23 Sep 2022 06:55 )             28.3     Mean Cell Volume: 91.0 fl (09-23-22 @ 06:55)    09-23    138  |  105  |  16  ----------------------------<  130<H>  3.8   |  23  |  1.03    Ca    9.0      23 Sep 2022 06:56  Mg     1.9     09-22    TPro  6.0  /  Alb  3.7  /  TBili  0.4  /  DBili  x   /  AST  18  /  ALT  19  /  AlkPhos  48  09-23    LIVER FUNCTIONS - ( 23 Sep 2022 06:56 )  Alb: 3.7 g/dL / Pro: 6.0 g/dL / ALK PHOS: 48 U/L / ALT: 19 U/L / AST: 18 U/L / GGT: x           PT/INR - ( 22 Sep 2022 09:45 )   PT: 11.8 sec;   INR: 1.03 ratio         PTT - ( 22 Sep 2022 09:45 )  PTT:26.3 sec                            8.8    6.44  )-----------( 220      ( 23 Sep 2022 06:55 )             28.3                         9.4    7.39  )-----------( 215      ( 22 Sep 2022 07:21 )             29.8                         9.3    8.67  )-----------( 224      ( 22 Sep 2022 00:33 )             29.0                         10.6   11.62 )-----------( 215      ( 21 Sep 2022 20:06 )             33.7       Imaging personally reviewed by me:          
Patient is a 67y old  Male who presents with a chief complaint of Dark stools (22 Sep 2022 14:03)      SUBJECTIVE / OVERNIGHT EVENTS:  Patient seen and examined.   Doing well , feels good.   No more bleeding per rectum,      Vital Signs Last 24 Hrs  T(C): 36.5 (23 Sep 2022 04:25), Max: 36.6 (22 Sep 2022 13:31)  T(F): 97.7 (23 Sep 2022 04:25), Max: 97.8 (22 Sep 2022 13:31)  HR: 66 (23 Sep 2022 04:25) (59 - 75)  BP: 135/72 (23 Sep 2022 04:25) (128/70 - 158/74)  BP(mean): --  RR: 18 (23 Sep 2022 04:25) (15 - 22)  SpO2: 94% (23 Sep 2022 04:25) (94% - 97%)    Parameters below as of 23 Sep 2022 04:25  Patient On (Oxygen Delivery Method): room air      I&O's Summary    22 Sep 2022 07:01  -  23 Sep 2022 07:00  --------------------------------------------------------  IN: 1110 mL / OUT: 400 mL / NET: 710 mL        PHYSICAL EXAM:  GENERAL: NAD, AAOx3  HEAD:  Atraumatic, Normocephalic  EYES: EOMI; conjunctiva and sclera clear  NECK: Supple, No JVD, No LAD  CHEST/LUNG: B/L air entry; No wheezes, rales or rhonci   HEART: Regular rate and rhythm; No murmurs, rubs, or gallops  ABDOMEN: Soft, Nontender, Nondistended; Bowel sounds present  EXTREMITIES:  2+ Peripheral Pulses, No clubbing, cyanosis, or edema  SKIN: No rashes or lesions    LABS:                        8.8    6.44  )-----------( 220      ( 23 Sep 2022 06:55 )             28.3     09-23    138  |  105  |  16  ----------------------------<  130<H>  3.8   |  23  |  1.03    Ca    9.0      23 Sep 2022 06:56  Mg     1.9     09-22    TPro  6.0  /  Alb  3.7  /  TBili  0.4  /  DBili  x   /  AST  18  /  ALT  19  /  AlkPhos  48  09-23    PT/INR - ( 22 Sep 2022 09:45 )   PT: 11.8 sec;   INR: 1.03 ratio         PTT - ( 22 Sep 2022 09:45 )  PTT:26.3 sec  CAPILLARY BLOOD GLUCOSE      POCT Blood Glucose.: 133 mg/dL (23 Sep 2022 09:29)  POCT Blood Glucose.: 122 mg/dL (22 Sep 2022 21:42)  POCT Blood Glucose.: 145 mg/dL (22 Sep 2022 13:11)            RADIOLOGY & ADDITIONAL TESTS:    Imaging Personally Reviewed:  [x] YES  [ ] NO    Consultant(s) Notes Reviewed:  [x] YES  [ ] NO      MEDICATIONS  (STANDING):  allopurinol 100 milliGRAM(s) Oral daily  aspirin enteric coated 81 milliGRAM(s) Oral daily  atorvastatin 20 milliGRAM(s) Oral daily  dextrose 5%. 1000 milliLiter(s) (50 mL/Hr) IV Continuous <Continuous>  dextrose 5%. 1000 milliLiter(s) (100 mL/Hr) IV Continuous <Continuous>  dextrose 50% Injectable 25 Gram(s) IV Push once  dextrose 50% Injectable 12.5 Gram(s) IV Push once  dextrose 50% Injectable 25 Gram(s) IV Push once  glucagon  Injectable 1 milliGRAM(s) IntraMuscular once  influenza  Vaccine (HIGH DOSE) 0.7 milliLiter(s) IntraMuscular once  insulin lispro (ADMELOG) corrective regimen sliding scale   SubCutaneous three times a day before meals  insulin lispro (ADMELOG) corrective regimen sliding scale   SubCutaneous at bedtime  losartan 100 milliGRAM(s) Oral daily  pantoprazole Infusion 8 mG/Hr (10 mL/Hr) IV Continuous <Continuous>    MEDICATIONS  (PRN):  acetaminophen     Tablet .. 650 milliGRAM(s) Oral every 6 hours PRN Temp greater or equal to 38C (100.4F), Mild Pain (1 - 3)  dextrose Oral Gel 15 Gram(s) Oral once PRN Blood Glucose LESS THAN 70 milliGRAM(s)/deciliter  melatonin 3 milliGRAM(s) Oral at bedtime PRN Insomnia  ondansetron Injectable 4 milliGRAM(s) IV Push every 8 hours PRN Nausea and/or Vomiting      Care Discussed with Consultants/Other Providers [x] YES  [ ] NO    HEALTH ISSUES - PROBLEM Dx:  Suspected deep vein thrombosis (DVT)    Suspected pulmonary embolism    Melena    CAD (coronary artery disease)    Diabetes mellitus    Essential hypertension    DVT prophylaxis        
  Chief Complaint:  Patient is a 67y old  Male who presents with a chief complaint of Dark stools (22 Sep 2022 14:03)      Date of service 09-23-22 @ 11:58      Interval Events:   Patient seen and examined.   No abdominal pain, n/v/d/c.  no melena    Hospital Medications:  acetaminophen     Tablet .. 650 milliGRAM(s) Oral every 6 hours PRN  allopurinol 100 milliGRAM(s) Oral daily  aspirin enteric coated 81 milliGRAM(s) Oral daily  atorvastatin 20 milliGRAM(s) Oral daily  dextrose 5%. 1000 milliLiter(s) IV Continuous <Continuous>  dextrose 5%. 1000 milliLiter(s) IV Continuous <Continuous>  dextrose 50% Injectable 25 Gram(s) IV Push once  dextrose 50% Injectable 12.5 Gram(s) IV Push once  dextrose 50% Injectable 25 Gram(s) IV Push once  dextrose Oral Gel 15 Gram(s) Oral once PRN  glucagon  Injectable 1 milliGRAM(s) IntraMuscular once  influenza  Vaccine (HIGH DOSE) 0.7 milliLiter(s) IntraMuscular once  insulin lispro (ADMELOG) corrective regimen sliding scale   SubCutaneous three times a day before meals  insulin lispro (ADMELOG) corrective regimen sliding scale   SubCutaneous at bedtime  lactated ringers. 1000 milliLiter(s) IV Continuous <Continuous>  melatonin 3 milliGRAM(s) Oral at bedtime PRN  ondansetron Injectable 4 milliGRAM(s) IV Push every 8 hours PRN  pantoprazole Infusion 8 mG/Hr IV Continuous <Continuous>        Review of Systems:  General:  No wt loss, fevers, chills, night sweats, fatigue,   Eyes:  Good vision, no reported pain  ENT:  No sore throat, pain, runny nose, dysphagia  CV:  No pain, palpitations, hypo/hypertension  Resp:  No dyspnea, cough, tachypnea, wheezing  GI:  See HPI  :  No pain, bleeding, incontinence, nocturia  Muscle:  No pain, weakness  Neuro:  No weakness, tingling, memory problems  Psych:  No fatigue, insomnia, mood problems, depression  Endocrine:  No polyuria, polydipsia, cold/heat intolerance  Heme:  No petechiae, ecchymosis, easy bruisability  Integumentary:  No rash, edema    PHYSICAL EXAM:   Vital Signs:  Vital Signs Last 24 Hrs  T(C): 36.5 (23 Sep 2022 04:25), Max: 36.6 (22 Sep 2022 13:31)  T(F): 97.7 (23 Sep 2022 04:25), Max: 97.8 (22 Sep 2022 13:31)  HR: 66 (23 Sep 2022 04:25) (59 - 75)  BP: 135/72 (23 Sep 2022 04:25) (128/70 - 158/74)  BP(mean): --  RR: 18 (23 Sep 2022 04:25) (15 - 22)  SpO2: 94% (23 Sep 2022 04:25) (94% - 97%)    Parameters below as of 23 Sep 2022 04:25  Patient On (Oxygen Delivery Method): room air      Daily Height in cm: 172.72 (22 Sep 2022 16:40)    Daily       PHYSICAL EXAM:     GENERAL:  Appears stated age, well-groomed, well-nourished, no distress  HEENT:  NC/AT,  conjunctivae anicteric, clear and pink,   NECK: supple, trachea midline  CHEST:  Full & symmetric excursion, no increased effort, breath sounds clear  HEART:  Regular rhythm, no JVD  ABDOMEN:  Soft, non-tender, non-distended, normoactive bowel sounds,  no masses , no hepatosplenomegaly  EXTREMITIES:  no cyanosis,clubbing or edema  SKIN:  No rash, erythema, or, ecchymoses, no jaundice  NEURO:  Alert, non-focal, no asterixis  PSYCH: Appropriate affect, oriented to place and time  RECTAL: Deferred      LABS Personally reviewed by me:                        8.8    6.44  )-----------( 220      ( 23 Sep 2022 06:55 )             28.3     Mean Cell Volume: 91.0 fl (09-23-22 @ 06:55)    09-23    138  |  105  |  16  ----------------------------<  130<H>  3.8   |  23  |  1.03    Ca    9.0      23 Sep 2022 06:56  Mg     1.9     09-22    TPro  6.0  /  Alb  3.7  /  TBili  0.4  /  DBili  x   /  AST  18  /  ALT  19  /  AlkPhos  48  09-23    LIVER FUNCTIONS - ( 23 Sep 2022 06:56 )  Alb: 3.7 g/dL / Pro: 6.0 g/dL / ALK PHOS: 48 U/L / ALT: 19 U/L / AST: 18 U/L / GGT: x           PT/INR - ( 22 Sep 2022 09:45 )   PT: 11.8 sec;   INR: 1.03 ratio         PTT - ( 22 Sep 2022 09:45 )  PTT:26.3 sec                            8.8    6.44  )-----------( 220      ( 23 Sep 2022 06:55 )             28.3                         9.4    7.39  )-----------( 215      ( 22 Sep 2022 07:21 )             29.8                         9.3    8.67  )-----------( 224      ( 22 Sep 2022 00:33 )             29.0                         10.6   11.62 )-----------( 215      ( 21 Sep 2022 20:06 )             33.7       Imaging personally reviewed by me:          
CARDIOLOGY FOLLOW UP - Dr. Upton  Date of Service: 9/24/22  CC: no events    Review of Systems:  Constitutional: No fever, weight loss, or fatigue  Respiratory: No cough, wheezing, or hemoptysis, no shortness of breath  Cardiovascular: No chest pain, palpitations, passing out, dizziness, or leg swelling  Gastrointestinal: No abd or epigastric pain. No nausea, vomiting, or hematemesis; no diarrhea or consiptaiton, no melena or hematochezia  Vascular: No edema     TELEMETRY:    PHYSICAL EXAM:  T(C): 36.6 (09-24-22 @ 04:00), Max: 36.7 (09-23-22 @ 20:38)  HR: 60 (09-24-22 @ 04:00) (60 - 71)  BP: 153/80 (09-24-22 @ 04:00) (113/69 - 153/80)  RR: 18 (09-24-22 @ 04:00) (18 - 18)  SpO2: 94% (09-24-22 @ 04:00) (93% - 94%)  Wt(kg): --  I&O's Summary    23 Sep 2022 07:01  -  24 Sep 2022 07:00  --------------------------------------------------------  IN: 1389 mL / OUT: 0 mL / NET: 1389 mL        Appearance: Normal	  Cardiovascular: Normal S1 S2,RRR, No JVD, No murmurs  Respiratory: Lungs clear to auscultation	  Gastrointestinal:  Soft, Non-tender, + BS	  Extremities: Normal range of motion, No clubbing, cyanosis or edema  Vascular: Peripheral pulses palpable 2+ bilaterally       Home Medications:  allopurinol 100 mg oral tablet: 1 tab(s) orally once a day (21 Sep 2022 21:42)  amLODIPine 10 mg oral tablet: 1 tab(s) orally once a day (21 Sep 2022 21:42)  aspirin 81 mg oral tablet: orally once a day (21 Sep 2022 21:42)  atorvastatin 20 mg oral tablet: 1 tab(s) orally once a day (21 Sep 2022 21:42)  clopidogrel 75 mg oral tablet: 1 tab(s) orally once a day (21 Sep 2022 21:42)  losartan-hydrochlorothiazide 100 mg-12.5 mg oral tablet: 1 tab(s) orally once a day (21 Sep 2022 21:42)  metFORMIN 850 mg oral tablet: 1 tab(s) orally 2 times a day (21 Sep 2022 21:42)  omeprazole 40 mg oral delayed release capsule: 1 cap(s) orally once a day (21 Sep 2022 21:42)        MEDICATIONS  (STANDING):  allopurinol 100 milliGRAM(s) Oral daily  amLODIPine   Tablet 5 milliGRAM(s) Oral daily  aspirin enteric coated 81 milliGRAM(s) Oral daily  atorvastatin 20 milliGRAM(s) Oral daily  dextrose 5%. 1000 milliLiter(s) (50 mL/Hr) IV Continuous <Continuous>  dextrose 5%. 1000 milliLiter(s) (100 mL/Hr) IV Continuous <Continuous>  dextrose 50% Injectable 25 Gram(s) IV Push once  dextrose 50% Injectable 12.5 Gram(s) IV Push once  dextrose 50% Injectable 25 Gram(s) IV Push once  glucagon  Injectable 1 milliGRAM(s) IntraMuscular once  influenza  Vaccine (HIGH DOSE) 0.7 milliLiter(s) IntraMuscular once  insulin lispro (ADMELOG) corrective regimen sliding scale   SubCutaneous three times a day before meals  insulin lispro (ADMELOG) corrective regimen sliding scale   SubCutaneous at bedtime  losartan 100 milliGRAM(s) Oral daily  pantoprazole    Tablet 40 milliGRAM(s) Oral two times a day        EKG:  RADIOLOGY:  DIAGNOSTIC TESTING:  [ ] Echocardiogram:  [ ] Catherterization:  [ ] Stress Test:  OTHER:     LABS:	 	                          9.1    6.93  )-----------( 242      ( 24 Sep 2022 07:17 )             28.7     09-24    141  |  106  |  17  ----------------------------<  107<H>  3.8   |  22  |  1.18    Ca    9.1      24 Sep 2022 07:16    TPro  6.0  /  Alb  3.7  /  TBili  0.4  /  DBili  x   /  AST  18  /  ALT  19  /  AlkPhos  48  09-23          CARDIAC MARKERS:

## 2023-03-19 NOTE — ED ADULT NURSE NOTE - FINAL NURSING ELECTRONIC SIGNATURE
Problem: Pressure Injury - Risk of  Goal: *Prevention of pressure injury  Description: Document Tanner Scale and appropriate interventions in the flowsheet. Outcome: Progressing Towards Goal  Note: Pressure Injury Interventions:  Sensory Interventions: Assess changes in LOC    Moisture Interventions: Absorbent underpads, Minimize layers    Activity Interventions: PT/OT evaluation, Increase time out of bed    Mobility Interventions: PT/OT evaluation, HOB 30 degrees or less    Nutrition Interventions: Document food/fluid/supplement intake    Friction and Shear Interventions: Apply protective barrier, creams and emollients, Minimize layers                Problem: Patient Education: Go to Patient Education Activity  Goal: Patient/Family Education  Outcome: Progressing Towards Goal     Problem: Falls - Risk of  Goal: *Absence of Falls  Description: Document Den Fall Risk and appropriate interventions in the flowsheet.   Outcome: Progressing Towards Goal  Note: Fall Risk Interventions:                                Problem: Patient Education: Go to Patient Education Activity  Goal: Patient/Family Education  Outcome: Progressing Towards Goal 22-Sep-2022 00:33

## 2023-12-21 NOTE — ASU PATIENT PROFILE, ADULT - FALL HARM RISK - PT AGE POPULATION HIDDEN
Subjective:       Patient ID: Herminio Turner is a 20 y.o. male.    Chief Complaint: Results    Pt presents to clinic today for lab follow up  He had some labs done with our hep team that showed some anemia  Here today for follow up  Denies any s/s of bleeding  No dark stools    Known to have GOLD  Working on weight loss  Seeing Dr. Ramirez at [facility]   May start wegovy in the near future         /80   Pulse (!) 114   Temp 97 °F (36.1 °C)   Wt (!) 171.4 kg (377 lb 13.9 oz)   SpO2 98%   BMI 48.52 kg/m²     Review of Systems   Constitutional:  Negative for activity change, appetite change, chills, diaphoresis, fatigue, fever and unexpected weight change.   HENT: Negative.     Eyes: Negative.    Respiratory:  Negative for apnea, chest tightness, shortness of breath and stridor.    Cardiovascular:  Negative for chest pain, palpitations and leg swelling.   Gastrointestinal: Negative.    Endocrine: Negative.    Genitourinary: Negative.    Musculoskeletal:  Negative for arthralgias and myalgias.   Skin:  Negative for color change, pallor, rash and wound.   Allergic/Immunologic: Negative.    Neurological:  Negative for dizziness, facial asymmetry, light-headedness and headaches.   Hematological:  Negative for adenopathy.   Psychiatric/Behavioral:  Negative for agitation and behavioral problems.        Objective:      Physical Exam  Vitals and nursing note reviewed.   Constitutional:       General: He is not in acute distress.     Appearance: He is well-developed. He is obese. He is not diaphoretic.   HENT:      Head: Normocephalic and atraumatic.   Cardiovascular:      Rate and Rhythm: Normal rate and regular rhythm.      Heart sounds: Normal heart sounds.   Pulmonary:      Effort: Pulmonary effort is normal. No respiratory distress.      Breath sounds: Normal breath sounds.   Skin:     General: Skin is warm and dry.      Findings: No rash.   Neurological:      Mental Status: He is  alert and oriented to person, place, and time.   Psychiatric:         Behavior: Behavior normal.         Thought Content: Thought content normal.         Judgment: Judgment normal.         Assessment:       1. Iron deficiency anemia, unspecified iron deficiency anemia type    2. NAFLD (nonalcoholic fatty liver disease)    3. BMI 45.0-49.9, adult        Plan:       Herminio was seen today for results.    Diagnoses and all orders for this visit:    Iron deficiency anemia, unspecified iron deficiency anemia type  -     iron-vitamin C 100-250 mg, ICAR-C, (ICAR-C) 100-250 mg Tab; Take 1 tablet by mouth once daily.  -     CBC Auto Differential; Future  -     Ferritin; Future  -     Iron and TIBC; Future    NAFLD (nonalcoholic fatty liver disease)    BMI 45.0-49.9, adult      Will start iron supplements  Labs in 3 months  Keep follow up with hep, Dr. Ramirez and DR. [place] as scheduled  Follow up for worsening or no improvement in symptoms and PRN.         Adult

## 2024-06-25 RX ORDER — ASPIRIN/CALCIUM CARB/MAGNESIUM 324 MG
0 TABLET ORAL
Qty: 0 | Refills: 0 | DISCHARGE

## 2024-06-25 RX ORDER — ALLOPURINOL 300 MG
1 TABLET ORAL
Qty: 0 | Refills: 0 | DISCHARGE

## 2024-06-25 RX ORDER — OMEPRAZOLE 10 MG/1
1 CAPSULE, DELAYED RELEASE ORAL
Qty: 0 | Refills: 0 | DISCHARGE

## 2024-06-25 RX ORDER — METFORMIN HYDROCHLORIDE 850 MG/1
1 TABLET ORAL
Qty: 0 | Refills: 0 | DISCHARGE

## 2024-06-25 RX ORDER — LOSARTAN/HYDROCHLOROTHIAZIDE 100MG-25MG
1 TABLET ORAL
Qty: 0 | Refills: 0 | DISCHARGE

## 2024-06-25 RX ORDER — ATORVASTATIN CALCIUM 80 MG/1
1 TABLET, FILM COATED ORAL
Qty: 0 | Refills: 0 | DISCHARGE

## 2024-06-25 RX ORDER — AMLODIPINE BESYLATE 2.5 MG/1
1 TABLET ORAL
Qty: 0 | Refills: 0 | DISCHARGE

## 2024-06-25 RX ORDER — CLOPIDOGREL BISULFATE 75 MG/1
1 TABLET, FILM COATED ORAL
Qty: 0 | Refills: 0 | DISCHARGE

## 2024-06-25 RX ORDER — ASPIRIN/CALCIUM CARB/MAGNESIUM 324 MG
1 TABLET ORAL
Qty: 0 | Refills: 0 | DISCHARGE

## (undated) DEVICE — CATH IV SAFE BC 22G X 1" (BLUE)

## (undated) DEVICE — TUBING SUCTION CONN 6FT STERILE

## (undated) DEVICE — SUCTION YANKAUER NO CONTROL VENT

## (undated) DEVICE — BIOPSY FORCEP RADIAL JAW 4 STANDARD WITH NEEDLE

## (undated) DEVICE — BALLOON US ENDO

## (undated) DEVICE — CATH IV SAFE BC 20G X 1.16" (PINK)

## (undated) DEVICE — SENSOR O2 FINGER ADULT

## (undated) DEVICE — TUBING IV SET GRAVITY 3Y 100" MACRO

## (undated) DEVICE — TUBING SUCTION 20FT

## (undated) DEVICE — PACK IV START WITH CHG

## (undated) DEVICE — FOLEY HOLDER STATLOCK 2 WAY ADULT

## (undated) DEVICE — BITE BLOCK ADULT 20 X 27MM (GREEN)

## (undated) DEVICE — SYR ALLIANCE II INFLATION 60ML

## (undated) DEVICE — SOL INJ NS 0.9% 500ML 2 PORT